# Patient Record
Sex: MALE | Race: WHITE | NOT HISPANIC OR LATINO | Employment: FULL TIME | URBAN - METROPOLITAN AREA
[De-identification: names, ages, dates, MRNs, and addresses within clinical notes are randomized per-mention and may not be internally consistent; named-entity substitution may affect disease eponyms.]

---

## 2019-10-10 ENCOUNTER — OFFICE VISIT - HEALTHEAST (OUTPATIENT)
Dept: FAMILY MEDICINE | Facility: CLINIC | Age: 39
End: 2019-10-10

## 2019-10-10 DIAGNOSIS — R03.0 ELEVATED BLOOD PRESSURE READING WITHOUT DIAGNOSIS OF HYPERTENSION: ICD-10-CM

## 2019-10-10 DIAGNOSIS — R05.9 COUGH: ICD-10-CM

## 2019-10-10 DIAGNOSIS — E66.01 MORBID OBESITY (H): ICD-10-CM

## 2019-10-10 DIAGNOSIS — J32.0 MAXILLARY SINUSITIS, UNSPECIFIED CHRONICITY: ICD-10-CM

## 2019-10-10 DIAGNOSIS — Z76.89 ENCOUNTER TO ESTABLISH CARE: ICD-10-CM

## 2019-10-10 RX ORDER — BENZONATATE 200 MG/1
200 CAPSULE ORAL 3 TIMES DAILY PRN
Qty: 30 CAPSULE | Refills: 0 | Status: SHIPPED | OUTPATIENT
Start: 2019-10-10 | End: 2022-04-15

## 2019-10-10 ASSESSMENT — MIFFLIN-ST. JEOR: SCORE: 2220.81

## 2021-06-02 ENCOUNTER — RECORDS - HEALTHEAST (OUTPATIENT)
Dept: ADMINISTRATIVE | Facility: CLINIC | Age: 41
End: 2021-06-02

## 2021-06-02 NOTE — PROGRESS NOTES
Assessment/Plan:     1. Encounter to establish care    2. Maxillary sinusitis, unspecified chronicity  Prescribed Augmentin twice daily x10 days.  This should cover for a pneumonia or sinus infection.  Tessalon 3 times daily as needed for cough.  Recommend following up with any worsening symptoms including fever, or shortness of breath.  - amoxicillin-clavulanate (AUGMENTIN) 875-125 mg per tablet; Take 1 tablet by mouth 2 (two) times a day for 10 days.  Dispense: 20 tablet; Refill: 0    3. Cough  - benzonatate (TESSALON) 200 MG capsule; Take 1 capsule (200 mg total) by mouth 3 (three) times a day as needed for cough.  Dispense: 30 capsule; Refill: 0    4. Elevated blood pressure reading without diagnosis of hypertension  Borderline blood pressure today in clinic.  No history of hypertension.  Patient is significantly lacking in sleep and has used a fair amount of caffeine today.  We will recheck at his next visit.    5. Morbid obesity (H)  Encouraged weight loss.        Subjective:     Ramiro Kessler is a 38 y.o. male who presents to establish care and with complaints of a cough.  Patient is  with 3 children.  He works as a .  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  Patient denies any chronic medical problems.  He is not on any regular medications.    Patient complains of a cough x4 weeks.  Symptoms started with sinus congestion.  The congestion has gotten better, but he has a lot of left facial pressure and postnasal drip.  Cough is mostly nonproductive.  He gets in to several coughing fits throughout the day, and is unable to stop.  Sleep has been disrupted.  Endorses left ear pressure.  No sore throat or fevers.  The patient thinks he is wheezing when he lays flat.  Does have shortness of breath, but only with coughing fits.  History of seasonal allergies.  Over-the-counter treatments include cough drops and cough  "suppressants.      The following portions of the patient's history were reviewed and updated as appropriate: allergies, current medications, past family history, past medical history, past social history, past surgical history and problem list.    Review of Systems  A comprehensive review of systems was performed and was otherwise negative    Objective:     /90   Pulse 93   Temp 98.7  F (37.1  C)   Ht 5' 10\" (1.778 m)   Wt (!) 286 lb 8 oz (130 kg)   SpO2 97%   BMI 41.11 kg/m      General Appearance: Alert, cooperative, no distress, appears stated age  Ears: Normal TM's and external ear canals, both ears  Nose: Nares normal, septum midline, mucosa edematous  Throat: Lips, mucosa, and tongue normal; teeth and gums normal  Neck: Supple, symmetrical, trachea midline, no adenopathy  Lungs: Lung sounds diminished on the left, respirations unlabored. No rhonchi or wheezing.  Heart: Regular rate and rhythm, S1 and S2 normal, no murmur, rub, or gallop    Ping Zuniga, NP    "

## 2021-06-03 VITALS
HEIGHT: 70 IN | OXYGEN SATURATION: 97 % | SYSTOLIC BLOOD PRESSURE: 146 MMHG | TEMPERATURE: 98.7 F | HEART RATE: 93 BPM | DIASTOLIC BLOOD PRESSURE: 90 MMHG | WEIGHT: 286.5 LBS | BODY MASS INDEX: 41.01 KG/M2

## 2022-02-17 ENCOUNTER — APPOINTMENT (OUTPATIENT)
Dept: CT IMAGING | Facility: CLINIC | Age: 42
End: 2022-02-17
Attending: EMERGENCY MEDICINE
Payer: COMMERCIAL

## 2022-02-17 ENCOUNTER — HOSPITAL ENCOUNTER (EMERGENCY)
Facility: CLINIC | Age: 42
Discharge: HOME | End: 2022-02-17
Attending: EMERGENCY MEDICINE | Admitting: EMERGENCY MEDICINE
Payer: COMMERCIAL

## 2022-02-17 VITALS
BODY MASS INDEX: 38.02 KG/M2 | HEART RATE: 87 BPM | RESPIRATION RATE: 26 BRPM | OXYGEN SATURATION: 95 % | SYSTOLIC BLOOD PRESSURE: 112 MMHG | TEMPERATURE: 97.6 F | WEIGHT: 265 LBS | DIASTOLIC BLOOD PRESSURE: 67 MMHG

## 2022-02-17 DIAGNOSIS — S29.011A INTERCOSTAL MUSCLE STRAIN, INITIAL ENCOUNTER: ICD-10-CM

## 2022-02-17 DIAGNOSIS — R09.1 PLEURISY: ICD-10-CM

## 2022-02-17 PROBLEM — U07.1 PNEUMONIA DUE TO COVID-19 VIRUS: Status: ACTIVE | Noted: 2021-12-25

## 2022-02-17 PROBLEM — J12.82 PNEUMONIA DUE TO COVID-19 VIRUS: Status: ACTIVE | Noted: 2021-12-25

## 2022-02-17 PROBLEM — E66.01 MORBID OBESITY (H): Status: ACTIVE | Noted: 2019-10-10

## 2022-02-17 PROBLEM — J96.01 ACUTE RESPIRATORY FAILURE WITH HYPOXIA (H): Status: ACTIVE | Noted: 2022-01-04

## 2022-02-17 LAB
ALBUMIN SERPL-MCNC: 4 G/DL (ref 3.5–5)
ALBUMIN UR-MCNC: 20 MG/DL
ALP SERPL-CCNC: 59 U/L (ref 45–120)
ALT SERPL W P-5'-P-CCNC: 29 U/L (ref 0–45)
AMORPH CRY #/AREA URNS HPF: ABNORMAL /HPF
ANION GAP SERPL CALCULATED.3IONS-SCNC: 8 MMOL/L (ref 5–18)
APPEARANCE UR: ABNORMAL
AST SERPL W P-5'-P-CCNC: 19 U/L (ref 0–40)
ATRIAL RATE - MUSE: 97 BPM
BASOPHILS # BLD AUTO: 0.1 10E3/UL (ref 0–0.2)
BASOPHILS NFR BLD AUTO: 1 %
BILIRUB SERPL-MCNC: 0.3 MG/DL (ref 0–1)
BILIRUB UR QL STRIP: NEGATIVE
BUN SERPL-MCNC: 7 MG/DL (ref 8–22)
CALCIUM SERPL-MCNC: 9.6 MG/DL (ref 8.5–10.5)
CAOX CRY #/AREA URNS HPF: ABNORMAL /HPF
CHLORIDE BLD-SCNC: 107 MMOL/L (ref 98–107)
CO2 SERPL-SCNC: 26 MMOL/L (ref 22–31)
COLOR UR AUTO: YELLOW
CREAT SERPL-MCNC: 0.78 MG/DL (ref 0.7–1.3)
DIASTOLIC BLOOD PRESSURE - MUSE: 93 MMHG
EOSINOPHIL # BLD AUTO: 0.5 10E3/UL (ref 0–0.7)
EOSINOPHIL NFR BLD AUTO: 4 %
ERYTHROCYTE [DISTWIDTH] IN BLOOD BY AUTOMATED COUNT: 15.3 % (ref 10–15)
GFR SERPL CREATININE-BSD FRML MDRD: >90 ML/MIN/1.73M2
GLUCOSE BLD-MCNC: 102 MG/DL (ref 70–125)
GLUCOSE UR STRIP-MCNC: NEGATIVE MG/DL
HCT VFR BLD AUTO: 47.5 % (ref 40–53)
HGB BLD-MCNC: 15.4 G/DL (ref 13.3–17.7)
HGB UR QL STRIP: NEGATIVE
HOLD SPECIMEN: NORMAL
HYALINE CASTS: 1 /LPF
IMM GRANULOCYTES # BLD: 0 10E3/UL
IMM GRANULOCYTES NFR BLD: 0 %
INTERPRETATION ECG - MUSE: NORMAL
KETONES UR STRIP-MCNC: NEGATIVE MG/DL
LEUKOCYTE ESTERASE UR QL STRIP: NEGATIVE
LIPASE SERPL-CCNC: 15 U/L (ref 0–52)
LYMPHOCYTES # BLD AUTO: 3.8 10E3/UL (ref 0.8–5.3)
LYMPHOCYTES NFR BLD AUTO: 29 %
MCH RBC QN AUTO: 26.8 PG (ref 26.5–33)
MCHC RBC AUTO-ENTMCNC: 32.4 G/DL (ref 31.5–36.5)
MCV RBC AUTO: 83 FL (ref 78–100)
MONOCYTES # BLD AUTO: 0.8 10E3/UL (ref 0–1.3)
MONOCYTES NFR BLD AUTO: 6 %
MUCOUS THREADS #/AREA URNS LPF: PRESENT /LPF
NEUTROPHILS # BLD AUTO: 8 10E3/UL (ref 1.6–8.3)
NEUTROPHILS NFR BLD AUTO: 60 %
NITRATE UR QL: NEGATIVE
NRBC # BLD AUTO: 0 10E3/UL
NRBC BLD AUTO-RTO: 0 /100
P AXIS - MUSE: 31 DEGREES
PH UR STRIP: 7 [PH] (ref 5–7)
PLATELET # BLD AUTO: 648 10E3/UL (ref 150–450)
POTASSIUM BLD-SCNC: 4.2 MMOL/L (ref 3.5–5)
PR INTERVAL - MUSE: 176 MS
PROT SERPL-MCNC: 7.3 G/DL (ref 6–8)
QRS DURATION - MUSE: 90 MS
QT - MUSE: 352 MS
QTC - MUSE: 447 MS
R AXIS - MUSE: 41 DEGREES
RBC # BLD AUTO: 5.74 10E6/UL (ref 4.4–5.9)
RBC URINE: 2 /HPF
SODIUM SERPL-SCNC: 141 MMOL/L (ref 136–145)
SP GR UR STRIP: 1.02 (ref 1–1.03)
SQUAMOUS EPITHELIAL: <1 /HPF
SYSTOLIC BLOOD PRESSURE - MUSE: 154 MMHG
T AXIS - MUSE: -7 DEGREES
TROPONIN I SERPL-MCNC: <0.01 NG/ML (ref 0–0.29)
UROBILINOGEN UR STRIP-MCNC: <2 MG/DL
VENTRICULAR RATE- MUSE: 97 BPM
WBC # BLD AUTO: 13.2 10E3/UL (ref 4–11)
WBC URINE: 5 /HPF

## 2022-02-17 PROCEDURE — 82040 ASSAY OF SERUM ALBUMIN: CPT | Performed by: EMERGENCY MEDICINE

## 2022-02-17 PROCEDURE — 250N000011 HC RX IP 250 OP 636: Performed by: EMERGENCY MEDICINE

## 2022-02-17 PROCEDURE — 99285 EMERGENCY DEPT VISIT HI MDM: CPT | Mod: 25

## 2022-02-17 PROCEDURE — 36415 COLL VENOUS BLD VENIPUNCTURE: CPT | Performed by: EMERGENCY MEDICINE

## 2022-02-17 PROCEDURE — 85025 COMPLETE CBC W/AUTO DIFF WBC: CPT | Performed by: EMERGENCY MEDICINE

## 2022-02-17 PROCEDURE — 71275 CT ANGIOGRAPHY CHEST: CPT

## 2022-02-17 PROCEDURE — 80053 COMPREHEN METABOLIC PANEL: CPT | Performed by: EMERGENCY MEDICINE

## 2022-02-17 PROCEDURE — 93005 ELECTROCARDIOGRAM TRACING: CPT | Performed by: EMERGENCY MEDICINE

## 2022-02-17 PROCEDURE — 81003 URINALYSIS AUTO W/O SCOPE: CPT | Performed by: EMERGENCY MEDICINE

## 2022-02-17 PROCEDURE — 83690 ASSAY OF LIPASE: CPT | Performed by: EMERGENCY MEDICINE

## 2022-02-17 PROCEDURE — 84484 ASSAY OF TROPONIN QUANT: CPT | Performed by: EMERGENCY MEDICINE

## 2022-02-17 PROCEDURE — 74176 CT ABD & PELVIS W/O CONTRAST: CPT

## 2022-02-17 RX ORDER — IOPAMIDOL 755 MG/ML
100 INJECTION, SOLUTION INTRAVASCULAR ONCE
Status: COMPLETED | OUTPATIENT
Start: 2022-02-17 | End: 2022-02-17

## 2022-02-17 RX ORDER — OXYCODONE AND ACETAMINOPHEN 5; 325 MG/1; MG/1
1 TABLET ORAL EVERY 6 HOURS PRN
Qty: 12 TABLET | Refills: 0 | Status: SHIPPED | OUTPATIENT
Start: 2022-02-17 | End: 2022-02-20

## 2022-02-17 RX ORDER — FAMOTIDINE 40 MG/1
40 TABLET, FILM COATED ORAL DAILY
Qty: 14 TABLET | Refills: 0 | Status: SHIPPED | OUTPATIENT
Start: 2022-02-17 | End: 2022-04-15

## 2022-02-17 RX ADMIN — IOPAMIDOL 75 ML: 755 INJECTION, SOLUTION INTRAVENOUS at 15:06

## 2022-02-17 ASSESSMENT — ENCOUNTER SYMPTOMS: COUGH: 1

## 2022-02-17 NOTE — ED TRIAGE NOTES
Patient is here with pleuric pain left side. He had an x ray last Friday with blood work and had continued covid pneumonia. Dx with covid on Dec 25. He was in ICU for 21 days and in the hospital for one month. He does have home o2 when sleeping he is on 2L NC and when walking on 4L. He is also on xarelto and cefdinir and just finished a z ga. o2 sats is 94% while walking on 4L.

## 2022-02-17 NOTE — ED PROVIDER NOTES
EMERGENCY DEPARTMENT ENCOUNTER      NAME: Ramiro Kessler  AGE: 41 year old male  YOB: 1980  MRN: [id number]  EVALUATION DATE & TIME: 2/17/2022 12:37 PM    PCP: Ping Zuniga    ED PROVIDER: Jeffrey Griggs MD    Chief Complaint   Patient presents with     Pleurisy     FINAL IMPRESSION:  1. Pleurisy    2. Intercostal muscle strain, initial encounter      ED COURSE & MEDICAL DECISION MAKING:    Pertinent Labs & Imaging studies reviewed. (See chart for details)  41 year old male presents to the Emergency Department for evaluation of pleuritic left-sided and left anterior chest pain.  History of severe Covid pneumonia requiring prolonged hospitalization discharged on chronic oxygen due to persistent hypoxia.  Slowly has been improving at home.  Persistent cough.  Had onset of left-sided chest pain with cough that was manageable at home had an outpatient x-ray that was negative for any unexpected findings had escalation to his symptoms today and presented to emergency department describes a pleuritic chest pain.  I felt based on his history and clinical examination this was most likely persistent pain from an intercostal strain due to repetitive coughing.  Rib fracture also a consideration.  Seems less likely to be a pulmonary emboli based on patient's chronic anticoagulation post hospitalization.  Ultimately due to the significance of patient's pain we proceeded with a work-up including CTA imaging of the chest and abdomen and pelvis given a history of kidney stones and there was some mild flank discomfort as a component to this.  His work-up overall demonstrating his COVID findings on CT scan imaging rest of the work-up was reassuring.  I think the patient is okay for discharge home with medication management of his symptoms I am going to add on also an antacid therapy as there could be a component of gastritis to this as well there was some mild left upper quadrant pain to palpation.  Ultimately  patient appearing safe for discharge with plan to follow-up on outpatient basis.  Patient comfortable this plan of care.          1:04 PM I met with the patient to gather history and to perform my initial exam. I discussed the plan for care while in the Emergency Department. PPE (gloves, glasses, surgical mask) was worn during patient encounters.   3:42 PM I re-evaluated the patient.     At the conclusion of the encounter I discussed the results of all of the tests and the disposition. The questions were answered. The patient or family acknowledged understanding and was agreeable with the care plan.     MEDICATIONS GIVEN IN THE EMERGENCY:  Medications   iopamidol (ISOVUE-370) solution 100 mL (75 mLs Intravenous Given 2/17/22 1506)       NEW PRESCRIPTIONS STARTED AT TODAY'S ER VISIT  New Prescriptions    FAMOTIDINE (PEPCID) 40 MG TABLET    Take 1 tablet (40 mg) by mouth daily    OXYCODONE-ACETAMINOPHEN (PERCOCET) 5-325 MG TABLET    Take 1 tablet by mouth every 6 hours as needed for pain          =================================================================    HPI    Patient information was obtained from: Patient    Use of : N/A       Ramiro Kessler is a 41 year old male with a pertinent history of obesity, COVID pneumonia, acute respiratory failure with hypoxia who presents to this ED by private vehicle for evaluation of pleuritic pain.    Per chart review, patient developed dry cough, shortness of breath, and diarrhea on 12/17 (~2 months ago) and tested positive for COVID-19 on 12/19 (~2 months ago). He had been seen in urgency room and started on Doxycycline for bacterial pneumonia on 12/22. His stats were 91% at the time. Patient was admitted on 12/25 (~2 months ago) when his stats dropped into the low 80s and CTA showed COVID pneumonia. He completed a 10 day course of Decadron. He received Tocilizumab as he was outside the window for remdesivir. Patient's D-dimer continued to increase during  "initial stay and patient started on Lovenox on 1/1/2022. They discharged the patient on 1/21/22 (~1 month ago) on 10L of oxygen for activity and 7L for rest. They planned to provide patient with at home PT/OT/SN. Discharged home in stable condition.     Patient reports he was diagnosed with COVID-19 on 12/25/2021 (~2 months ago) and spent 21 days in the ICU with COVID pneumonia. He never required intubation, but patient did go home on oxygen. Patient currently uses 4L during activity and 2L during the night. He continues to experience cough. About 2 weeks ago after coughing he developed right pleuritic pain that he states was a 3/10 in severity. Patient states pain is resolved with Tylenol and Ibuprofen. However, on 2/11 (~6 days ago) the pain returned at 5/10 in severity. Since he has been able to manage it with Ibuprofen, but today while sitting forward and coughing patient felt a \"popping sensation\" and since has been experiencing severe pain. Pain is at a 10/10 in severity. The pain is positional, so when he is coughing or sitting forward it exacerbates, but when lying down it resolves. Patient denies additional medical concerns or complaints at this time.       REVIEW OF SYSTEMS   Review of Systems   Respiratory: Positive for cough.         Endorses right sided pleuritic pain.   All other systems reviewed and are negative.      PAST MEDICAL HISTORY:  History reviewed. No pertinent past medical history.    PAST SURGICAL HISTORY:  Past Surgical History:   Procedure Laterality Date     NO PAST SURGERIES             CURRENT MEDICATIONS:    famotidine (PEPCID) 40 MG tablet  oxyCODONE-acetaminophen (PERCOCET) 5-325 MG tablet  benzonatate (TESSALON) 200 MG capsule        ALLERGIES:  No Known Allergies    FAMILY HISTORY:  History reviewed. No pertinent family history.    SOCIAL HISTORY:   Social History     Socioeconomic History     Marital status:      Spouse name: Not on file     Number of children: Not on " file     Years of education: Not on file     Highest education level: Not on file   Occupational History     Not on file   Tobacco Use     Smoking status: Never Smoker     Smokeless tobacco: Never Used   Substance and Sexual Activity     Alcohol use: Yes     Drug use: Not on file     Sexual activity: Not on file   Other Topics Concern     Not on file   Social History Narrative     Not on file     Social Determinants of Health     Financial Resource Strain: Not on file   Food Insecurity: Not on file   Transportation Needs: Not on file   Physical Activity: Not on file   Stress: Not on file   Social Connections: Not on file   Intimate Partner Violence: Not on file   Housing Stability: Not on file       VITALS:  /67   Pulse 87   Temp 97.6  F (36.4  C)   Resp 26   Wt 120.2 kg (265 lb)   SpO2 95%   BMI 38.02 kg/m      PHYSICAL EXAM    PHYSICAL EXAM    Constitutional: Well developed, Well nourished, NAD  HENT: Normocephalic, Atraumatic, Bilateral external ears normal, Oropharynx normal, mucous membranes moist, Nose normal. Neck-  Normal range of motion, No tenderness, Supple, No stridor.   Eyes: PERRL, EOMI, Conjunctiva normal, No discharge.   Respiratory: Normal breath sounds, No respiratory distress, No wheezing, Speaks full sentences easily. No cough.   Cardiovascular: tachycardia   GI:  Soft, No tenderness, No masses, No flank tenderness. No rebound or guarding.  : No cva tenderness    Musculoskeletal: 2+ DP pulses. No edema. No cyanosis. Good range of motion in all major joints. No tenderness to palpation. No tenderness of the CTLS spine.   Integument: Warm, Dry, No erythema, No rash. No petechiae.   Neurologic: Alert & oriented x 3, Normal motor function, Normal sensory function, No focal deficits noted.   Psychiatric: Affect normal, Judgment normal, Mood normal. Cooperative.        LAB:  All pertinent labs reviewed and interpreted.  Results for orders placed or performed during the hospital encounter  of 02/17/22   CT Chest Pulmonary Embolism w Contrast    Impression    IMPRESSION:  1.  Negative for pulmonary emboli and negative for dissections.    2.  Moderate diffuse interstitial infiltrates in both lungs. Appearance most suggestive of subacute to chronic inflammatory changes from previous COVID pneumonia.    3.  Would be difficult to definitively exclude an acute superimposed pneumonia but in general this appears more chronic to subacute.   CT Abdomen Pelvis w/o Contrast    Impression    IMPRESSION:   1.  Nothing to explain abdominal pain.    2.  Multiple Gallstones.    3.  A couple tiny nonobstructing intrarenal stones left kidney.     Extra Blue Top Tube   Result Value Ref Range    Hold Specimen JIC    Extra Green Top (Lithium Heparin) Tube   Result Value Ref Range    Hold Specimen     Extra Purple Top Tube   Result Value Ref Range    Hold Specimen     Comprehensive metabolic panel   Result Value Ref Range    Sodium 141 136 - 145 mmol/L    Potassium 4.2 3.5 - 5.0 mmol/L    Chloride 107 98 - 107 mmol/L    Carbon Dioxide (CO2) 26 22 - 31 mmol/L    Anion Gap 8 5 - 18 mmol/L    Urea Nitrogen 7 (L) 8 - 22 mg/dL    Creatinine 0.78 0.70 - 1.30 mg/dL    Calcium 9.6 8.5 - 10.5 mg/dL    Glucose 102 70 - 125 mg/dL    Alkaline Phosphatase 59 45 - 120 U/L    AST 19 0 - 40 U/L    ALT 29 0 - 45 U/L    Protein Total 7.3 6.0 - 8.0 g/dL    Albumin 4.0 3.5 - 5.0 g/dL    Bilirubin Total 0.3 0.0 - 1.0 mg/dL    GFR Estimate >90 >60 mL/min/1.73m2   Result Value Ref Range    Lipase 15 0 - 52 U/L   Result Value Ref Range    Troponin I <0.01 0.00 - 0.29 ng/mL   UA with Microscopic reflex to Culture    Specimen: Urine, Midstream   Result Value Ref Range    Color Urine Yellow Colorless, Straw, Light Yellow, Yellow    Appearance Urine Turbid (A) Clear    Glucose Urine Negative Negative mg/dL    Bilirubin Urine Negative Negative    Ketones Urine Negative Negative mg/dL    Specific Gravity Urine 1.025 1.001 - 1.030    Blood Urine Negative  Negative    pH Urine 7.0 5.0 - 7.0    Protein Albumin Urine 20  (A) Negative mg/dL    Urobilinogen Urine <2.0 <2.0 mg/dL    Nitrite Urine Negative Negative    Leukocyte Esterase Urine Negative Negative    Mucus Urine Present (A) None Seen /LPF    Amorphous Crystals Urine Few (A) None Seen /HPF    Calcium Oxalate Crystals Urine Moderate (A) None Seen /HPF    RBC Urine 2 <=2 /HPF    WBC Urine 5 <=5 /HPF    Squamous Epithelials Urine <1 <=1 /HPF    Hyaline Casts Urine 1 <=2 /LPF   CBC with platelets and differential   Result Value Ref Range    WBC Count 13.2 (H) 4.0 - 11.0 10e3/uL    RBC Count 5.74 4.40 - 5.90 10e6/uL    Hemoglobin 15.4 13.3 - 17.7 g/dL    Hematocrit 47.5 40.0 - 53.0 %    MCV 83 78 - 100 fL    MCH 26.8 26.5 - 33.0 pg    MCHC 32.4 31.5 - 36.5 g/dL    RDW 15.3 (H) 10.0 - 15.0 %    Platelet Count 648 (H) 150 - 450 10e3/uL    % Neutrophils 60 %    % Lymphocytes 29 %    % Monocytes 6 %    % Eosinophils 4 %    % Basophils 1 %    % Immature Granulocytes 0 %    NRBCs per 100 WBC 0 <1 /100    Absolute Neutrophils 8.0 1.6 - 8.3 10e3/uL    Absolute Lymphocytes 3.8 0.8 - 5.3 10e3/uL    Absolute Monocytes 0.8 0.0 - 1.3 10e3/uL    Absolute Eosinophils 0.5 0.0 - 0.7 10e3/uL    Absolute Basophils 0.1 0.0 - 0.2 10e3/uL    Absolute Immature Granulocytes 0.0 <=0.4 10e3/uL    Absolute NRBCs 0.0 10e3/uL   ECG 12-LEAD WITH MUSE (LHE)   Result Value Ref Range    Systolic Blood Pressure 154 mmHg    Diastolic Blood Pressure 93 mmHg    Ventricular Rate 97 BPM    Atrial Rate 97 BPM    CT Interval 176 ms    QRS Duration 90 ms     ms    QTc 447 ms    P Axis 31 degrees    R AXIS 41 degrees    T Axis -7 degrees    Interpretation ECG       Sinus rhythm  Normal ECG  When compared with ECG of 11-AUG-2010 10:24,  No significant change was found  Confirmed by SEE ED PROVIDER NOTE FOR, ECG INTERPRETATION ([id number]),  JOHANNA DIAZ ([id number]) on 2/17/2022 3:51:35 PM         RADIOLOGY:  Reviewed all pertinent imaging. Please see  official radiology report.  CT Chest Pulmonary Embolism w Contrast   Final Result   IMPRESSION:   1.  Negative for pulmonary emboli and negative for dissections.      2.  Moderate diffuse interstitial infiltrates in both lungs. Appearance most suggestive of subacute to chronic inflammatory changes from previous COVID pneumonia.      3.  Would be difficult to definitively exclude an acute superimposed pneumonia but in general this appears more chronic to subacute.      CT Abdomen Pelvis w/o Contrast   Final Result   IMPRESSION:    1.  Nothing to explain abdominal pain.      2.  Multiple Gallstones.      3.  A couple tiny nonobstructing intrarenal stones left kidney.             EKG:    Performed at: 13:23    Impression: Sinus rhythm, normal ECG    Rate: 97  Rhythm: Sinus  Axis: 41  ID Interval: 176  QRS Interval: 90  QTc Interval: 447  ST Changes: None  Comparison: August 11, 2020    I have independently reviewed and interpreted the EKG(s) documented above.        I, Azizaisabel Feldman, am serving as a scribe to document services personally performed by Indu Murphy MD based on my observation and the provider's statements to me. I, Jeffrey Griggs MD, attest that Aziza Feldman is acting in a scribe capacity, has observed my performance of the services and has documented them in accordance with my direction.    Indu Murphy MD  Emergency Medicine  [facility] EMERGENCY ROOM  [address] [facility] [id number] [phone]       Jeffrey Griggs MD  02/17/22 [id number]

## 2022-02-17 NOTE — DISCHARGE INSTRUCTIONS
Recommend short course of pain management for your discomfort.  Most likely this is an intercostal muscle strain where the muscles between your ribs were injured due to your repetitive coughing.  Expect improvement to your symptoms over the next couple of days.  In addition I would recommend taking antacid therapy in case there is a component of gastritis.  Follow-up with your primary care doctor and return if symptoms escalate

## 2022-02-17 NOTE — ED NOTES
Patient discharged home with AVS. Will  meds from pharmacy and take as directed. Will follow up with PCP.

## 2022-04-15 ENCOUNTER — APPOINTMENT (OUTPATIENT)
Dept: ULTRASOUND IMAGING | Facility: CLINIC | Age: 42
End: 2022-04-15
Attending: EMERGENCY MEDICINE
Payer: COMMERCIAL

## 2022-04-15 ENCOUNTER — HOSPITAL ENCOUNTER (OUTPATIENT)
Facility: CLINIC | Age: 42
Discharge: HOME | End: 2022-04-15
Attending: EMERGENCY MEDICINE | Admitting: INTERNAL MEDICINE
Payer: COMMERCIAL

## 2022-04-15 ENCOUNTER — ANESTHESIA (OUTPATIENT)
Dept: SURGERY | Facility: CLINIC | Age: 42
End: 2022-04-15
Payer: COMMERCIAL

## 2022-04-15 ENCOUNTER — ANESTHESIA EVENT (OUTPATIENT)
Dept: SURGERY | Facility: CLINIC | Age: 42
End: 2022-04-15
Payer: COMMERCIAL

## 2022-04-15 VITALS
SYSTOLIC BLOOD PRESSURE: 164 MMHG | HEART RATE: 99 BPM | RESPIRATION RATE: 20 BRPM | HEIGHT: 70 IN | BODY MASS INDEX: 38.98 KG/M2 | OXYGEN SATURATION: 93 % | WEIGHT: 272.25 LBS | TEMPERATURE: 97.3 F | DIASTOLIC BLOOD PRESSURE: 89 MMHG

## 2022-04-15 DIAGNOSIS — K81.0 ACUTE CHOLECYSTITIS: ICD-10-CM

## 2022-04-15 DIAGNOSIS — K81.9 CHOLECYSTITIS: Primary | ICD-10-CM

## 2022-04-15 LAB
ALBUMIN SERPL-MCNC: 4.4 G/DL (ref 3.5–5)
ALP SERPL-CCNC: 71 U/L (ref 45–120)
ALT SERPL W P-5'-P-CCNC: 26 U/L (ref 0–45)
AMPHETAMINES UR QL SCN: ABNORMAL
ANION GAP SERPL CALCULATED.3IONS-SCNC: 15 MMOL/L (ref 5–18)
AST SERPL W P-5'-P-CCNC: 24 U/L (ref 0–40)
ATRIAL RATE - MUSE: 100 BPM
BARBITURATES UR QL: ABNORMAL
BASOPHILS # BLD AUTO: 0.1 10E3/UL (ref 0–0.2)
BASOPHILS NFR BLD AUTO: 0 %
BENZODIAZ UR QL: ABNORMAL
BILIRUB SERPL-MCNC: 0.4 MG/DL (ref 0–1)
BUN SERPL-MCNC: 11 MG/DL (ref 8–22)
CALCIUM SERPL-MCNC: 9.6 MG/DL (ref 8.5–10.5)
CANNABINOIDS UR QL SCN: ABNORMAL
CHLORIDE BLD-SCNC: 103 MMOL/L (ref 98–107)
CO2 SERPL-SCNC: 21 MMOL/L (ref 22–31)
COCAINE UR QL: ABNORMAL
CREAT SERPL-MCNC: 0.93 MG/DL (ref 0.7–1.3)
CREAT UR-MCNC: 220 MG/DL
DIASTOLIC BLOOD PRESSURE - MUSE: 83 MMHG
EOSINOPHIL # BLD AUTO: 0.1 10E3/UL (ref 0–0.7)
EOSINOPHIL NFR BLD AUTO: 1 %
ERYTHROCYTE [DISTWIDTH] IN BLOOD BY AUTOMATED COUNT: 14.6 % (ref 10–15)
GFR SERPL CREATININE-BSD FRML MDRD: >90 ML/MIN/1.73M2
GLUCOSE BLD-MCNC: 147 MG/DL (ref 70–125)
HCT VFR BLD AUTO: 49.4 % (ref 40–53)
HGB BLD-MCNC: 16.5 G/DL (ref 13.3–17.7)
HOLD SPECIMEN: NORMAL
IMM GRANULOCYTES # BLD: 0.1 10E3/UL
IMM GRANULOCYTES NFR BLD: 0 %
INTERPRETATION ECG - MUSE: NORMAL
LACTATE SERPL-SCNC: 1.4 MMOL/L (ref 0.7–2)
LIPASE SERPL-CCNC: 19 U/L (ref 0–52)
LYMPHOCYTES # BLD AUTO: 2.8 10E3/UL (ref 0.8–5.3)
LYMPHOCYTES NFR BLD AUTO: 21 %
MCH RBC QN AUTO: 27 PG (ref 26.5–33)
MCHC RBC AUTO-ENTMCNC: 33.4 G/DL (ref 31.5–36.5)
MCV RBC AUTO: 81 FL (ref 78–100)
METHADONE UR QL SCN: ABNORMAL
MONOCYTES # BLD AUTO: 0.7 10E3/UL (ref 0–1.3)
MONOCYTES NFR BLD AUTO: 5 %
NEUTROPHILS # BLD AUTO: 10 10E3/UL (ref 1.6–8.3)
NEUTROPHILS NFR BLD AUTO: 73 %
NRBC # BLD AUTO: 0 10E3/UL
NRBC BLD AUTO-RTO: 0 /100
OPIATES UR QL SCN: ABNORMAL
OXYCODONE UR QL: ABNORMAL
P AXIS - MUSE: 43 DEGREES
PCP UR QL SCN: ABNORMAL
PLATELET # BLD AUTO: 741 10E3/UL (ref 150–450)
POTASSIUM BLD-SCNC: 4.2 MMOL/L (ref 3.5–5)
PR INTERVAL - MUSE: 182 MS
PROT SERPL-MCNC: 7.5 G/DL (ref 6–8)
QRS DURATION - MUSE: 102 MS
QT - MUSE: 364 MS
QTC - MUSE: 469 MS
R AXIS - MUSE: 54 DEGREES
RBC # BLD AUTO: 6.11 10E6/UL (ref 4.4–5.9)
SARS-COV-2 RNA RESP QL NAA+PROBE: NEGATIVE
SODIUM SERPL-SCNC: 139 MMOL/L (ref 136–145)
SYSTOLIC BLOOD PRESSURE - MUSE: 136 MMHG
T AXIS - MUSE: 18 DEGREES
VENTRICULAR RATE- MUSE: 100 BPM
WBC # BLD AUTO: 13.7 10E3/UL (ref 4–11)

## 2022-04-15 PROCEDURE — 87635 SARS-COV-2 COVID-19 AMP PRB: CPT | Performed by: EMERGENCY MEDICINE

## 2022-04-15 PROCEDURE — 258N000003 HC RX IP 258 OP 636: Performed by: EMERGENCY MEDICINE

## 2022-04-15 PROCEDURE — 96360 HYDRATION IV INFUSION INIT: CPT

## 2022-04-15 PROCEDURE — 250N000011 HC RX IP 250 OP 636: Performed by: SPECIALIST

## 2022-04-15 PROCEDURE — 258N000003 HC RX IP 258 OP 636: Performed by: ANESTHESIOLOGY

## 2022-04-15 PROCEDURE — 99285 EMERGENCY DEPT VISIT HI MDM: CPT | Mod: 25

## 2022-04-15 PROCEDURE — 250N000009 HC RX 250: Performed by: ANESTHESIOLOGY

## 2022-04-15 PROCEDURE — 47562 LAPAROSCOPIC CHOLECYSTECTOMY: CPT | Performed by: SPECIALIST

## 2022-04-15 PROCEDURE — 85025 COMPLETE CBC W/AUTO DIFF WBC: CPT | Performed by: EMERGENCY MEDICINE

## 2022-04-15 PROCEDURE — 80053 COMPREHEN METABOLIC PANEL: CPT | Performed by: EMERGENCY MEDICINE

## 2022-04-15 PROCEDURE — 250N000011 HC RX IP 250 OP 636: Performed by: EMERGENCY MEDICINE

## 2022-04-15 PROCEDURE — 93005 ELECTROCARDIOGRAM TRACING: CPT | Mod: XU | Performed by: EMERGENCY MEDICINE

## 2022-04-15 PROCEDURE — 999N000141 HC STATISTIC PRE-PROCEDURE NURSING ASSESSMENT: Performed by: SPECIALIST

## 2022-04-15 PROCEDURE — 250N000025 HC SEVOFLURANE, PER MIN: Performed by: SPECIALIST

## 2022-04-15 PROCEDURE — 250N000011 HC RX IP 250 OP 636: Performed by: ANESTHESIOLOGY

## 2022-04-15 PROCEDURE — 96375 TX/PRO/DX INJ NEW DRUG ADDON: CPT

## 2022-04-15 PROCEDURE — 250N000013 HC RX MED GY IP 250 OP 250 PS 637: Performed by: ANESTHESIOLOGY

## 2022-04-15 PROCEDURE — 80307 DRUG TEST PRSMV CHEM ANLYZR: CPT | Performed by: EMERGENCY MEDICINE

## 2022-04-15 PROCEDURE — 76705 ECHO EXAM OF ABDOMEN: CPT

## 2022-04-15 PROCEDURE — 250N000011 HC RX IP 250 OP 636: Performed by: INTERNAL MEDICINE

## 2022-04-15 PROCEDURE — 96365 THER/PROPH/DIAG IV INF INIT: CPT

## 2022-04-15 PROCEDURE — 36415 COLL VENOUS BLD VENIPUNCTURE: CPT | Performed by: EMERGENCY MEDICINE

## 2022-04-15 PROCEDURE — 370N000017 HC ANESTHESIA TECHNICAL FEE, PER MIN: Performed by: SPECIALIST

## 2022-04-15 PROCEDURE — C9803 HOPD COVID-19 SPEC COLLECT: HCPCS

## 2022-04-15 PROCEDURE — 258N000001 HC RX 258: Performed by: SPECIALIST

## 2022-04-15 PROCEDURE — 99222 1ST HOSP IP/OBS MODERATE 55: CPT | Performed by: INTERNAL MEDICINE

## 2022-04-15 PROCEDURE — 96376 TX/PRO/DX INJ SAME DRUG ADON: CPT

## 2022-04-15 PROCEDURE — 250N000011 HC RX IP 250 OP 636: Performed by: NURSE ANESTHETIST, CERTIFIED REGISTERED

## 2022-04-15 PROCEDURE — 99222 1ST HOSP IP/OBS MODERATE 55: CPT | Mod: 57 | Performed by: SPECIALIST

## 2022-04-15 PROCEDURE — 360N000076 HC SURGERY LEVEL 3, PER MIN: Performed by: SPECIALIST

## 2022-04-15 PROCEDURE — 710N000010 HC RECOVERY PHASE 1, LEVEL 2, PER MIN: Performed by: SPECIALIST

## 2022-04-15 PROCEDURE — 96361 HYDRATE IV INFUSION ADD-ON: CPT

## 2022-04-15 PROCEDURE — 272N000001 HC OR GENERAL SUPPLY STERILE: Performed by: SPECIALIST

## 2022-04-15 PROCEDURE — 250N000009 HC RX 250: Performed by: NURSE ANESTHETIST, CERTIFIED REGISTERED

## 2022-04-15 PROCEDURE — 88304 TISSUE EXAM BY PATHOLOGIST: CPT | Mod: TC | Performed by: SPECIALIST

## 2022-04-15 PROCEDURE — 250N000013 HC RX MED GY IP 250 OP 250 PS 637: Performed by: SPECIALIST

## 2022-04-15 PROCEDURE — 83605 ASSAY OF LACTIC ACID: CPT | Performed by: EMERGENCY MEDICINE

## 2022-04-15 PROCEDURE — 83690 ASSAY OF LIPASE: CPT | Performed by: EMERGENCY MEDICINE

## 2022-04-15 RX ORDER — ONDANSETRON 4 MG/1
4 TABLET, ORALLY DISINTEGRATING ORAL EVERY 30 MIN PRN
Status: DISCONTINUED | OUTPATIENT
Start: 2022-04-15 | End: 2022-04-15 | Stop reason: HOSPADM

## 2022-04-15 RX ORDER — OXYCODONE HYDROCHLORIDE 5 MG/1
5 TABLET ORAL EVERY 4 HOURS PRN
Status: DISCONTINUED | OUTPATIENT
Start: 2022-04-15 | End: 2022-04-15 | Stop reason: HOSPADM

## 2022-04-15 RX ORDER — SODIUM CHLORIDE 9 MG/ML
INJECTION, SOLUTION INTRAVENOUS CONTINUOUS
Status: DISCONTINUED | OUTPATIENT
Start: 2022-04-15 | End: 2022-04-15 | Stop reason: HOSPADM

## 2022-04-15 RX ORDER — HYDROMORPHONE HCL IN WATER/PF 6 MG/30 ML
0.2 PATIENT CONTROLLED ANALGESIA SYRINGE INTRAVENOUS EVERY 5 MIN PRN
Status: DISCONTINUED | OUTPATIENT
Start: 2022-04-15 | End: 2022-04-15 | Stop reason: HOSPADM

## 2022-04-15 RX ORDER — SODIUM CHLORIDE, SODIUM LACTATE, POTASSIUM CHLORIDE, CALCIUM CHLORIDE 600; 310; 30; 20 MG/100ML; MG/100ML; MG/100ML; MG/100ML
INJECTION, SOLUTION INTRAVENOUS CONTINUOUS
Status: DISCONTINUED | OUTPATIENT
Start: 2022-04-15 | End: 2022-04-15 | Stop reason: HOSPADM

## 2022-04-15 RX ORDER — FENTANYL CITRATE 50 UG/ML
25 INJECTION, SOLUTION INTRAMUSCULAR; INTRAVENOUS EVERY 5 MIN PRN
Status: DISCONTINUED | OUTPATIENT
Start: 2022-04-15 | End: 2022-04-15 | Stop reason: HOSPADM

## 2022-04-15 RX ORDER — ONDANSETRON 2 MG/ML
4 INJECTION INTRAMUSCULAR; INTRAVENOUS EVERY 30 MIN PRN
Status: DISCONTINUED | OUTPATIENT
Start: 2022-04-15 | End: 2022-04-15 | Stop reason: HOSPADM

## 2022-04-15 RX ORDER — LIDOCAINE 40 MG/G
CREAM TOPICAL
Status: DISCONTINUED | OUTPATIENT
Start: 2022-04-15 | End: 2022-04-15 | Stop reason: HOSPADM

## 2022-04-15 RX ORDER — ONDANSETRON 4 MG/1
4 TABLET, ORALLY DISINTEGRATING ORAL EVERY 6 HOURS PRN
Status: DISCONTINUED | OUTPATIENT
Start: 2022-04-15 | End: 2022-04-15 | Stop reason: HOSPADM

## 2022-04-15 RX ORDER — ALBUTEROL SULFATE 90 UG/1
1-2 AEROSOL, METERED RESPIRATORY (INHALATION) EVERY 4 HOURS PRN
COMMUNITY

## 2022-04-15 RX ORDER — KETOROLAC TROMETHAMINE 30 MG/ML
INJECTION, SOLUTION INTRAMUSCULAR; INTRAVENOUS PRN
Status: DISCONTINUED | OUTPATIENT
Start: 2022-04-15 | End: 2022-04-15

## 2022-04-15 RX ORDER — FENTANYL CITRATE 50 UG/ML
25 INJECTION, SOLUTION INTRAMUSCULAR; INTRAVENOUS
Status: DISCONTINUED | OUTPATIENT
Start: 2022-04-15 | End: 2022-04-15 | Stop reason: HOSPADM

## 2022-04-15 RX ORDER — HYDROCODONE BITARTRATE AND ACETAMINOPHEN 5; 325 MG/1; MG/1
1 TABLET ORAL
Status: DISCONTINUED | OUTPATIENT
Start: 2022-04-15 | End: 2022-04-15 | Stop reason: HOSPADM

## 2022-04-15 RX ORDER — NALOXONE HYDROCHLORIDE 0.4 MG/ML
0.4 INJECTION, SOLUTION INTRAMUSCULAR; INTRAVENOUS; SUBCUTANEOUS
Status: DISCONTINUED | OUTPATIENT
Start: 2022-04-15 | End: 2022-04-15 | Stop reason: HOSPADM

## 2022-04-15 RX ORDER — ALBUTEROL SULFATE 90 UG/1
1-2 AEROSOL, METERED RESPIRATORY (INHALATION) EVERY 4 HOURS PRN
Status: DISCONTINUED | OUTPATIENT
Start: 2022-04-15 | End: 2022-04-15 | Stop reason: HOSPADM

## 2022-04-15 RX ORDER — NALOXONE HYDROCHLORIDE 0.4 MG/ML
0.2 INJECTION, SOLUTION INTRAMUSCULAR; INTRAVENOUS; SUBCUTANEOUS
Status: DISCONTINUED | OUTPATIENT
Start: 2022-04-15 | End: 2022-04-15 | Stop reason: HOSPADM

## 2022-04-15 RX ORDER — DEXAMETHASONE SODIUM PHOSPHATE 10 MG/ML
INJECTION, SOLUTION INTRAMUSCULAR; INTRAVENOUS PRN
Status: DISCONTINUED | OUTPATIENT
Start: 2022-04-15 | End: 2022-04-15

## 2022-04-15 RX ORDER — FENTANYL CITRATE 50 UG/ML
INJECTION, SOLUTION INTRAMUSCULAR; INTRAVENOUS PRN
Status: DISCONTINUED | OUTPATIENT
Start: 2022-04-15 | End: 2022-04-15

## 2022-04-15 RX ORDER — KETOROLAC TROMETHAMINE 15 MG/ML
15 INJECTION, SOLUTION INTRAMUSCULAR; INTRAVENOUS ONCE
Status: COMPLETED | OUTPATIENT
Start: 2022-04-15 | End: 2022-04-15

## 2022-04-15 RX ORDER — PROPOFOL 10 MG/ML
INJECTION, EMULSION INTRAVENOUS PRN
Status: DISCONTINUED | OUTPATIENT
Start: 2022-04-15 | End: 2022-04-15

## 2022-04-15 RX ORDER — MORPHINE SULFATE 4 MG/ML
4 INJECTION, SOLUTION INTRAMUSCULAR; INTRAVENOUS
Status: DISCONTINUED | OUTPATIENT
Start: 2022-04-15 | End: 2022-04-15

## 2022-04-15 RX ORDER — OXYCODONE AND ACETAMINOPHEN 5; 325 MG/1; MG/1
1 TABLET ORAL EVERY 6 HOURS PRN
Qty: 20 TABLET | Refills: 0 | Status: SHIPPED | OUTPATIENT
Start: 2022-04-15 | End: 2022-04-18

## 2022-04-15 RX ORDER — BUPIVACAINE HYDROCHLORIDE 2.5 MG/ML
INJECTION, SOLUTION INFILTRATION; PERINEURAL PRN
Status: DISCONTINUED | OUTPATIENT
Start: 2022-04-15 | End: 2022-04-15 | Stop reason: HOSPADM

## 2022-04-15 RX ORDER — LORAZEPAM 0.5 MG/1
0.5 TABLET ORAL
COMMUNITY

## 2022-04-15 RX ORDER — ACETAMINOPHEN 325 MG/1
975 TABLET ORAL ONCE
Status: COMPLETED | OUTPATIENT
Start: 2022-04-15 | End: 2022-04-15

## 2022-04-15 RX ORDER — ONDANSETRON 2 MG/ML
4 INJECTION INTRAMUSCULAR; INTRAVENOUS EVERY 6 HOURS PRN
Status: DISCONTINUED | OUTPATIENT
Start: 2022-04-15 | End: 2022-04-15 | Stop reason: HOSPADM

## 2022-04-15 RX ORDER — PIPERACILLIN SODIUM, TAZOBACTAM SODIUM 3; .375 G/15ML; G/15ML
3.38 INJECTION, POWDER, LYOPHILIZED, FOR SOLUTION INTRAVENOUS ONCE
Status: COMPLETED | OUTPATIENT
Start: 2022-04-15 | End: 2022-04-15

## 2022-04-15 RX ORDER — MORPHINE SULFATE 2 MG/ML
2 INJECTION, SOLUTION INTRAMUSCULAR; INTRAVENOUS
Status: DISCONTINUED | OUTPATIENT
Start: 2022-04-15 | End: 2022-04-15 | Stop reason: HOSPADM

## 2022-04-15 RX ORDER — MEPERIDINE HYDROCHLORIDE 25 MG/ML
12.5 INJECTION INTRAMUSCULAR; INTRAVENOUS; SUBCUTANEOUS
Status: DISCONTINUED | OUTPATIENT
Start: 2022-04-15 | End: 2022-04-15 | Stop reason: HOSPADM

## 2022-04-15 RX ORDER — PIPERACILLIN SODIUM, TAZOBACTAM SODIUM 3; .375 G/15ML; G/15ML
3.38 INJECTION, POWDER, LYOPHILIZED, FOR SOLUTION INTRAVENOUS EVERY 8 HOURS
Status: DISCONTINUED | OUTPATIENT
Start: 2022-04-15 | End: 2022-04-15 | Stop reason: HOSPADM

## 2022-04-15 RX ADMIN — MORPHINE SULFATE 4 MG: 4 INJECTION, SOLUTION INTRAMUSCULAR; INTRAVENOUS at 07:40

## 2022-04-15 RX ADMIN — FENTANYL CITRATE 100 MCG: 50 INJECTION, SOLUTION INTRAMUSCULAR; INTRAVENOUS at 14:07

## 2022-04-15 RX ADMIN — KETOROLAC TROMETHAMINE 15 MG: 15 INJECTION, SOLUTION INTRAMUSCULAR; INTRAVENOUS at 07:33

## 2022-04-15 RX ADMIN — FENTANYL CITRATE 25 MCG: 50 INJECTION, SOLUTION INTRAMUSCULAR; INTRAVENOUS at 15:52

## 2022-04-15 RX ADMIN — SODIUM CHLORIDE 1000 ML: 9 INJECTION, SOLUTION INTRAVENOUS at 07:45

## 2022-04-15 RX ADMIN — SODIUM CHLORIDE, POTASSIUM CHLORIDE, SODIUM LACTATE AND CALCIUM CHLORIDE: 600; 310; 30; 20 INJECTION, SOLUTION INTRAVENOUS at 13:27

## 2022-04-15 RX ADMIN — SUGAMMADEX 200 MG: 100 INJECTION, SOLUTION INTRAVENOUS at 15:01

## 2022-04-15 RX ADMIN — ONDANSETRON 4 MG: 2 INJECTION INTRAMUSCULAR; INTRAVENOUS at 14:40

## 2022-04-15 RX ADMIN — FENTANYL CITRATE 50 MCG: 50 INJECTION, SOLUTION INTRAMUSCULAR; INTRAVENOUS at 14:34

## 2022-04-15 RX ADMIN — ROCURONIUM BROMIDE 50 MG: 10 INJECTION, SOLUTION INTRAVENOUS at 14:07

## 2022-04-15 RX ADMIN — DEXAMETHASONE SODIUM PHOSPHATE 10 MG: 10 INJECTION, SOLUTION INTRAMUSCULAR; INTRAVENOUS at 14:07

## 2022-04-15 RX ADMIN — PROPOFOL 50 MG: 10 INJECTION, EMULSION INTRAVENOUS at 14:58

## 2022-04-15 RX ADMIN — KETOROLAC TROMETHAMINE 30 MG: 30 INJECTION, SOLUTION INTRAMUSCULAR at 15:15

## 2022-04-15 RX ADMIN — ONDANSETRON 4 MG: 2 INJECTION INTRAMUSCULAR; INTRAVENOUS at 09:45

## 2022-04-15 RX ADMIN — PROPOFOL 200 MG: 10 INJECTION, EMULSION INTRAVENOUS at 14:07

## 2022-04-15 RX ADMIN — OXYCODONE HYDROCHLORIDE 5 MG: 5 TABLET ORAL at 16:54

## 2022-04-15 RX ADMIN — PROPOFOL 100 MG: 10 INJECTION, EMULSION INTRAVENOUS at 14:30

## 2022-04-15 RX ADMIN — HYDROMORPHONE HYDROCHLORIDE 0.2 MG: 0.2 INJECTION, SOLUTION INTRAMUSCULAR; INTRAVENOUS; SUBCUTANEOUS at 16:14

## 2022-04-15 RX ADMIN — LIDOCAINE HYDROCHLORIDE 20 ML: 10 INJECTION, SOLUTION INFILTRATION; PERINEURAL at 14:07

## 2022-04-15 RX ADMIN — HYDROMORPHONE HYDROCHLORIDE 0.2 MG: 0.2 INJECTION, SOLUTION INTRAMUSCULAR; INTRAVENOUS; SUBCUTANEOUS at 16:07

## 2022-04-15 RX ADMIN — FENTANYL CITRATE 25 MCG: 50 INJECTION, SOLUTION INTRAMUSCULAR; INTRAVENOUS at 15:57

## 2022-04-15 RX ADMIN — PIPERACILLIN AND TAZOBACTAM 3.38 G: 3; .375 INJECTION, POWDER, LYOPHILIZED, FOR SOLUTION INTRAVENOUS at 09:00

## 2022-04-15 RX ADMIN — FENTANYL CITRATE 25 MCG: 50 INJECTION, SOLUTION INTRAMUSCULAR; INTRAVENOUS at 15:47

## 2022-04-15 RX ADMIN — MORPHINE SULFATE 4 MG: 4 INJECTION, SOLUTION INTRAMUSCULAR; INTRAVENOUS at 10:36

## 2022-04-15 RX ADMIN — ACETAMINOPHEN 975 MG: 325 TABLET ORAL at 13:30

## 2022-04-15 RX ADMIN — SODIUM CHLORIDE: 9 INJECTION, SOLUTION INTRAVENOUS at 09:10

## 2022-04-15 RX ADMIN — FENTANYL CITRATE 25 MCG: 50 INJECTION, SOLUTION INTRAMUSCULAR; INTRAVENOUS at 15:42

## 2022-04-15 RX ADMIN — MORPHINE SULFATE 2 MG: 2 INJECTION, SOLUTION INTRAMUSCULAR; INTRAVENOUS at 12:23

## 2022-04-15 RX ADMIN — MIDAZOLAM 2 MG: 1 INJECTION INTRAMUSCULAR; INTRAVENOUS at 14:05

## 2022-04-15 ASSESSMENT — ACTIVITIES OF DAILY LIVING (ADL)
ADLS_ACUITY_SCORE: 7

## 2022-04-15 NOTE — CONSULTS
Consultation - Surgery  Ramiro Kessler,  1980, MRN [id number]    Admitting Dx: Acute cholecystitis [K81.0]    PCP: Nicholas Gallegos, None   Code status:  No Order       Extended Emergency Contact Information  Primary Emergency Contact: Yumiko Kessler  Address: [address] [place] [address] [facility]  Mobile Phone: [phone]  Relation: Spouse       Assessment and Plan   Impression:  Acute cholecystitis in a gentleman who is just recovering from a Covid infection.  He is fairly recently weaned off of his oxygen.  However in CT scan he clearly has cholecystitis and I think needs his gallbladder out.  If he was still very ill then we could entertain just doing a percutaneous cholecystostomy tube but I think at worst he may end up back on his oxygen for short time.  There is always some risk but I think a short operation with the gallbladder is in his best interest.      Plan:  Laparoscopic cholecystectomy.  Because of his just recently coming off oxygen at home from Covid, will probably want to watch him tonight in the hospital.  If he does exceedingly well in the recovery room then potentially could still just be discharged home later today.           Chief Complaint <principal problem not specified>       HPI    We have been requested by Dr. Nova to evaluate Ramiro Kessler for acute cholecystitis.  This is a 41 year old year old male with a rather abrupt onset of abdominal pain in the right upper quadrant at 3:00 this morning.  He has never had pain like this before although he does state it somewhat similar to when he had kidney stones.  He has had no previous abdominal surgery.  Pain is persistent and not any better after starting.  Somewhat hurts when he takes a big breath and moves.  CT scan showed multiple stones in his gallbladder.  On ultrasound he will once again confirmed gallstones with gallbladder wall thickening and a positive Varner sign.       Medical  History  Patient Active Problem List   Diagnosis     Morbid obesity (H)     Pneumonia due to COVID-19 virus     Closed dislocation of other part of foot     Acute respiratory failure with hypoxia (H)     Acute cholecystitis     Cholecystitis       [unfilled] Surgical History  Past Surgical History:   Procedure Laterality Date     NO PAST SURGERIES          Social History  Social History     Tobacco Use     Smoking status: Never Smoker     Smokeless tobacco: Never Used   Substance Use Topics     Alcohol use: Yes       Allergies  No Known Allergies Family History  Reviewed, and family history is not on file.  The Family history is not pertinent to the patients chief complaint. Psychosocial Needs  Social History     Social History Narrative     Not on file     Additional psychosocial needs reviewed per nursing assessment.     Prior to Admission Medications   Current Outpatient Medications   Medication Instructions     albuterol (PROAIR HFA/PROVENTIL HFA/VENTOLIN HFA) 108 (90 Base) MCG/ACT inhaler 1-2 puffs, Inhalation, EVERY 4 HOURS PRN     LORazepam (ATIVAN) 0.5 mg, Oral, AT BEDTIME PRN           Review of Systems:  Pertinent items are noted in HPI. Physical Exam:  Temp:  [97.7  F (36.5  C)] 97.7  F (36.5  C)  Pulse:  [] 109  Resp:  [20] 20  BP: (136-150)/(80-94) 140/80  SpO2:  [95 %-98 %] 97 %    General appearance: alert, appears stated age, cooperative and morbidly obese  Eyes: No scleral icterus  Lungs: clear to auscultation bilaterally  Heart:   Abdomen: Obese.  Quite tender in the right upper quadrant with some mild guarding but no rebound.  Extremities: extremities, peripheral pulses and reflexes normal  Pulses: 2+ and symmetric  Skin: Skin color, texture, turgor normal. No rashes or lesions  Neurologic: Grossly normal       Pertinent Labs  Lab Results: personally reviewed.   Lab Results   Component Value Date     04/15/2022    CO2 21 04/15/2022    BUN 11 04/15/2022     Lab Results   Component Value  Date    WBC 13.7 04/15/2022    HGB 16.5 04/15/2022    HCT 49.4 04/15/2022    MCV 81 04/15/2022     04/15/2022     Lab Results   Component Value Date    AST 24 04/15/2022     Lab Results   Component Value Date    ALT 26 04/15/2022     Lab Results   Component Value Date    BILITOTAL 0.4 04/15/2022    BILITOTAL 0.3 02/17/2022        Pertinent Radiology  Radiology Results: Personally reviewed image/s and Personally reviewed impression/s  EKG Results: not reviewed.

## 2022-04-15 NOTE — H&P
"[facility] MEDICINE ADMISSION HISTORY AND PHYSICAL     Assessment & Plan       Ramiro Kessler is a 41 year old old male with history of COVID-19 requiring hospitalizationcomplications and gallstones diagnosed 2 months ago, who presented with right upper quadrant pain that started at 0300 today. Being admitted for acute cholecystitis with cholelithiasis. Ultrasound shows acute cholecystitis and hepatomegaly. Surgery consulted, and he will have a laparoscopic cholecystectomy today.    Acute Cholecystitis with cholelithiasis  N.p.o. IV fluids, pain control, zosyn  Lft's lipase normal  Surgery consultation appreciated    Hepatomegaly/fatty liver  Former drinker  He has not had a drink in 2 months    H/o COVID-19 infection  - Not needing supplemental oxygen, 95% on RA  - Lung sounds clear  Encourage IS    Possible Sleep Apnea  Obesity BMI 38  Admits to snoring.   - Recommended outpatient sleep study     Clinically Significant Risk Factors Present on Admission            # Anion Gap Metabolic Acidosis: AG = 15 mmol/L (Ref range: 5 - 18 mmol/L) on admission, will monitor and treat as appropriate       # Obesity: Estimated body mass index is 38.45 kg/m  as calculated from the following:    Height as of this encounter: 1.778 m (5' 10\").    Weight as of this encounter: 121.6 kg (268 lb).        DVTP: Mechanical Prophylaxis/ Sequential Compression Devices  Code Status: No Order - Full Code   Disposition: Observation   Expected LOS: 1 day  Goals for the hospitalization: Cholecystectomy and improvement in abdominal pain  Disposition Plan   Expected Discharge: Tonight or tomorrow.  Anticipated discharge location:  Awaiting care coordination huddle  Delays: Cholecystectomy, postsurgical cares       The patient's care was discussed with the Attending Physician, Dr. See, Patient and Patient's Family.  Chief Complaint Right upper quadrant pain     HISTORY     Ramiro Kessler is a 41 year old old male with " "h/o COVID complications requiring Pulmonary Rehab, who p/w right upper quadrant pain that started at 0300 today. About two months ago, he was seen at [facility] and hospitalized for COVID complications, where they found gallstones on imaging. He denies abdominal pain prior to this morning. Pain is mostly in the RUQ that is \"excruciating\" and radiates to his stomach. Rates pain as 8/10 without pain medication, and 4/10 with pain medication. He has not eaten or drank anything since last night. Admits to nausea. No change in bowels.     He has been completing Pulmonary Rehab for a few months, s/p COVID-19. He was requiring oxygen with activity, but has not needed to for the past five weeks. Takes albuterol as needed for SOB. Oxygen would drop to 86-90% when going up one flight of stairs.     Past Medical History      H/o COVID-19 infection     Surgical History     Past Surgical History:   Procedure Laterality Date     NO PAST SURGERIES       Family History    Reviewed, and   Family History   Problem Relation Age of Onset     Prostate Cancer Maternal Grandfather         Social History      Social History     Tobacco Use     Smoking status: Never Smoker     Smokeless tobacco: Never Used   Substance Use Topics     Alcohol use: Not Currently     Comment: No alcohol for the past 4 months        Allergies   No Known Allergies  Prior to Admission Medications      Prior to Admission Medications   Prescriptions Last Dose Informant Patient Reported? Taking?   LORazepam (ATIVAN) 0.5 MG tablet   Yes Yes   Sig: Take 0.5 mg by mouth nightly as needed for anxiety   albuterol (PROAIR HFA/PROVENTIL HFA/VENTOLIN HFA) 108 (90 Base) MCG/ACT inhaler Past Month at Unknown time  Yes Yes   Sig: Inhale 1-2 puffs into the lungs every 4 hours as needed for shortness of breath / dyspnea or wheezing      Facility-Administered Medications: None      Review of Systems     A 12 point comprehensive review of systems was negative except as noted above " in HPI.    PHYSICAL EXAMINATION       Vitals      Temp:  [97.7  F (36.5  C)] 97.7  F (36.5  C)  Pulse:  [] 106  Resp:  [20] 20  BP: (136-150)/(72-94) 136/78  SpO2:  [95 %-98 %] 96 %    Examination     GENERAL:  Alert, appears comfortable, in no acute distress, appears stated age   HEAD:  Normocephalic, without obvious abnormality, atraumatic   EYES:  PERRL, conjunctiva/corneas clear, no scleral icterus   THROAT: Lips, mucosa, and tongue, gums normal, mouth moist   NECK: Supple, symmetrical, trachea midline   BACK:   Symmetric, no curvature, ROM normal   LUNGS:   Clear to auscultation bilaterally, no rales, rhonchi, or wheezing, symmetric chest rise on inhalation, respirations unlabored   CHEST WALL:  No tenderness or deformity   HEART:  Regular rate and rhythm, S1 and S2 normal, no murmur, rub, or gallop    ABDOMEN:   Soft, tender in RUQ, bowel sounds active all four quadrants, no masses, no organomegaly, no rebound or guarding   EXTREMITIES: Extremities normal, atraumatic, no cyanosis or edema    SKIN: Dry to touch, no exanthems in the visualized areas   NEURO: Alert, oriented x 4, moves all four extremities freely, non-focal   PSYCH: Cooperative, behavior is appropriate      Pertinent Lab     Most Recent 3 CBC's:Recent Labs   Lab Test 04/15/22  0728 02/17/22  1304   WBC 13.7* 13.2*   HGB 16.5 15.4   MCV 81 83   * 648*     Most Recent 3 BMP's:Recent Labs   Lab Test 04/15/22  0728 02/17/22  1428    141   POTASSIUM 4.2 4.2   CHLORIDE 103 107   CO2 21* 26   BUN 11 7*   CR 0.93 0.78   ANIONGAP 15 8   ISABELLE 9.6 9.6   * 102     Most Recent 2 LFT's:Recent Labs   Lab Test 04/15/22  0728 02/17/22  1428   AST 24 19   ALT 26 29   ALKPHOS 71 59   BILITOTAL 0.4 0.3     Most Recent 6 glucoses:Recent Labs   Lab Test 04/15/22  0728 02/17/22  1428   * 102         Pertinent Radiology     Radiology Results:   Recent Results (from the past 24 hour(s))   US Abdomen Limited (RUQ)    Narrative    EXAM: US  ABDOMEN LIMITED  LOCATION: [facility]  DATE/TIME: 4/15/2022 7:48 AM    INDICATION: pain  COMPARISON: 02/17/2022  TECHNIQUE: Limited abdominal ultrasound.    FINDINGS:    GALLBLADDER: Cholelithiasis. There is gallbladder wall thickening and a positive sonographic Varner's sign.    BILE DUCTS: No biliary dilatation. The common duct measures 4 mm.    LIVER: Increased echogenicity from diffuse fatty infiltration. Hepatomegaly. No focal mass.    RIGHT KIDNEY: No hydronephrosis.    PANCREAS: The visualized portions are normal.    No ascites.      Impression    IMPRESSION:  1.  Findings suggestive of acute calculus cholecystitis.  2.  Hepatic steatosis and hepatomegaly.         EKG Results: personally reviewed.  Sinus rhythm no acute ST T wave changes       MOUSTAPHA Zuñiga  Hospitalist  Hospital Medicine  [facility]   Phone: #[phone]    I agree with the documentation and findings as written by Delilah GERARD and our discussed and formulated assessment and plan. I was present with her who participated in the service and documentation of the note. I have also personally verified the history and personally performed the physical exam and medical decision-making. I agree with the assessment and plan of care as documented in the note.     Flori See MD  Internal Medicine  Hospitalist  [facility]  Phone: #[phone]

## 2022-04-15 NOTE — PHARMACY-ADMISSION MEDICATION HISTORY
Pharmacy Note - Admission Medication History    Pertinent Provider Information:      ______________________________________________________________________    Prior To Admission (PTA) med list completed and updated in EMR.       PTA Med List   Medication Sig Last Dose     albuterol (PROAIR HFA/PROVENTIL HFA/VENTOLIN HFA) 108 (90 Base) MCG/ACT inhaler Inhale 1-2 puffs into the lungs every 4 hours as needed for shortness of breath / dyspnea or wheezing Past Month at Unknown time     LORazepam (ATIVAN) 0.5 MG tablet Take 0.5 mg by mouth nightly as needed for anxiety        Information source(s): Patient and CareEverywhere/SureScripts  Method of interview communication: in-person    Summary of Changes to PTA Med List  New: albuterol, lorazepam  Discontinued: benzonatate, famotidine  Changed: none    Patient was asked about OTC/herbal products specifically.  PTA med list reflects this.      Allergies were reviewed, assessed, and updated with the patient.      Patient does not anticipate needing any multi-use medications during admission.    The information provided in this note is only as accurate as the sources available at the time of the update(s).    Thank you for the opportunity to participate in the care of this patient.    Priscilla Ksesler RPH  4/15/2022 9:12 AM

## 2022-04-15 NOTE — OP NOTE
Operative Note    Name:  Ramiro Kessler  PCP:  Nicholas Gallegos  Procedure Date:  4/15/2022       Procedure:  Procedure(s):  CHOLECYSTECTOMY, LAPAROSCOPIC     Pre-Procedure Diagnosis:  Cholecystitis [K81.9]     Post-Procedure Diagnosis:    Same     Surgeon(s):  Flores Olmedo MD     Assistant: None      Anesthesia Type:  General       Findings:  Distended gallbladder full of stones.    Operative Report:    The patient was properly identified and brought to the operating suite where they were placed in the supine position, general anesthetic was administered and prepped and draped in a sterile fashion.  A small incision was made below the umbilicus and a Veress needle passed into the abdominal cavity which was insufflated with carbon dioxide.  A 5mm trocar port was then placed followed by the camera.  Under direct vision a 10 mm port was placed in the epigastrium and two 5 mm ports in the right upper quadrant.  The gallbladder was visualized.  It was quite distended and the gallbladder wall was somewhat thickened.  With traction on the gallbladder dissection was carried out in the triangle of Calot where the cystic duct and artery were carefully identified, dissected free, clipped and divided.  The gallbladder was removed from the gallbladder bed with electrocautery with no difficulty and pulled up through the epigastric incision.  The port was replaced and the entire area irrigated until clear.  Hemostasis was assured.  All the ports removed and each site closed with subcuticular sutures of 4-0 Monocryl.  Sterile dressings were placed.  Each site was also infiltrated with quarter percent Marcaine for a total of 40 mL.  The patient tolerated the procedure well.    Estimated Blood Loss:   5 cc    Specimens:    ID Type Source Tests Collected by Time Destination   1 :  Tissue Gallbladder with Stone(s) SURGICAL PATHOLOGY EXAM Flores Olmedo MD 4/15/2022  2:42 PM            Drains:   NG/OG/NJ Tube Orogastric 18 fr  Center mouth (Active)       Complications:    None    Flores Olmedo MD     Date: 4/15/2022  Time: 3:08 PM

## 2022-04-15 NOTE — ANESTHESIA CARE TRANSFER NOTE
Patient: Ramiro Kessler    Procedure: Procedure(s):  CHOLECYSTECTOMY, LAPAROSCOPIC       Diagnosis: Cholecystitis [K81.9]  Diagnosis Additional Information: No value filed.    Anesthesia Type:   General     Note:    Oropharynx: oropharynx clear of all foreign objects  Level of Consciousness: drowsy  Oxygen Supplementation: face mask  Level of Supplemental Oxygen (L/min / FiO2): 8  Independent Airway: airway patency satisfactory and stable  Dentition: dentition unchanged  Vital Signs Stable: post-procedure vital signs reviewed and stable  Report to RN Given: handoff report given  Patient transferred to: PACU    Handoff Report: Identifed the Patient, Identified the Reponsible Provider, Reviewed the pertinent medical history, Discussed the surgical course, Reviewed Intra-OP anesthesia mangement and issues during anesthesia, Set expectations for post-procedure period and Allowed opportunity for questions and acknowledgement of understanding      Vitals:  Vitals Value Taken Time   /86 04/15/22 1520   Temp 97.2f    Pulse 99 04/15/22 1521   Resp 22 04/15/22 1521   SpO2 98 % 04/15/22 1521   Vitals shown include unvalidated device data.    Electronically Signed By: MARGOT SMITH CRNA  April 15, 2022  3:22 PM

## 2022-04-15 NOTE — ED PROVIDER NOTES
"EMERGENCY DEPARTMENT ENCOUNTER            IMPRESSION:  Acute cholecystitis      MEDICAL DECISION MAKING:  Patient evaluated for right upper quadrant abdominal pain.  He has a history of Covid with some persistent pulmonary insufficiency    On exam he appears uncomfortable.  Slightly tachycardic.  Lungs are somewhat diminished.  He is tender right upper quadrant.    An IV was started and he was given pain medication IV fluids    EKG does not show evidence of ischemia    White blood cell count is elevated    Chemistry is unremarkable    Urine drug screen shows presence of opiates which were administered in the emergency department.  The urine drug screen was erroneously ordered.  I intended on ordering urinalysis.     Lactic acid and lipase are normal.    Ultrasound shows evidence of acute cholecystitis.    Results were discussed with the patient.  IV antibiotics were ordered    I spoke with on-call surgery.  I have paged the hospitalist      =================================================================  CHIEF COMPLAINT:  Chief Complaint   Patient presents with     Abdominal Pain         HPI  Ramiro Kessler is a 41 year old male with a noncontributory past medical history who presents to the ED by private auto, accompanied by brother, for evaluation of abdominal pain.     Patient reports being woken by sharp right upper quadrant abdominal pain at ~3:00AM this morning. Since onset, the pain has been constant and he repeatedly describes it as \"excruciating.\" He denies any associated nausea or vomit. He has not had any food or water intake due to the pain. Patient's pain is provoked more with palpation. He states the pain radiates throughout the stomach, but is described as sore and bloating outside of the right upper quadrant region. He also notes experiencing chills and feverish, with no objective fever. Currently, he reports he \"don't feel good at all\" and cannot find a position to relieve pain.     Patient did " have noted gall stones ~1.5 weeks ago. He also describes a history of kidney stones but reports his pain today is different. He denies a history of abdominal surgeries. He further reports having COVID ~3 months ago, being in the ICU for 21 days. He is still in recovery, attending rehab every Tuesday and Thursday. Patient stopped blood thinners 3 weeks ago, for which he was on for preventative measures. He denies a past medical history of pulmonary or cardiac issues. He denies any recent sick contacts. Patient denies back pain, changes in stool, changes in urinary patterns, chest pain, leg swelling, or any other complaints at his time.     I, Haley Contreras am serving as a scribe to document services personally performed by Dr. Conor Nova MD, based on my observation and the provider's statements to me. I, Dr. Conor Nova MD attest that Haley Contreras is acting in a scribe capacity, has observed my performance of the services and has documented them in accordance with my direction.      REVIEW OF SYSTEMS   Constitutional: Denies unintentional weight loss or fatigue. Positive for chills, feverish.   Eyes: Denies visual changes or discharge    HENT: Denies sore throat, ear pain or neck pain  Respiratory: Denies cough or shortness of breath    Cardiovascular: Denies chest pain, palpitations or leg swelling  GI: Denies nausea, vomiting, or dark, bloody stools. Positive for RUQ pain.   : Denies hematuria, dysuria, or flank pain  Musculoskeletal: Denies any new back pain or new muscle/joint pains  Skin: Denies rash or wound  Neurologic: Denies current headache, new weakness, focal weakness, or sensory changes    Lymphatic: Denies swollen glands    Psychiatric: Denies depression, suicidal ideation or homicidal ideation.    Remainder of systems reviewed, unless noted in HPI all others negative.      PAST MEDICAL HISTORY:  History reviewed. No pertinent past medical history.    PAST SURGICAL HISTORY:  Past Surgical History:  "  Procedure Laterality Date     NO PAST SURGERIES           CURRENT MEDICATIONS:    benzonatate (TESSALON) 200 MG capsule  famotidine (PEPCID) 40 MG tablet        ALLERGIES:  No Known Allergies    FAMILY HISTORY:  History reviewed. No pertinent family history.    SOCIAL HISTORY:   Social History     Socioeconomic History     Marital status:    Tobacco Use     Smoking status: Never Smoker     Smokeless tobacco: Never Used   Substance and Sexual Activity     Alcohol use: Yes       PHYSICAL EXAM:    BP (!) 140/80   Pulse 109   Temp 97.7  F (36.5  C) (Oral)   Resp 20   Ht 1.778 m (5' 10\")   Wt 121.6 kg (268 lb)   SpO2 97%   BMI 38.45 kg/m      Constitutional: Awake, alert, in no acute distress  Head: Normocephalic, atraumatic.  ENT: Mucous membranes moist. Posterior oropharynx appears normal.  Eyes: Pupils midrange and reactive ,no conjunctival discharge  Neck: No lymphadenopathy, no stridor, supple, no soft tissue swelling  Chest: No tenderness   Respiratory: Respirations even, unlabored. Lungs clear to ascultation bilaterally, in no acute respiratory distress.  Cardiovascular: Regular rate and rhythm.+2 radial pulses, equal bilaterally.  No murmurs.   GI: Abdomen soft, tender right upper quadrant no guarding or rebound. Bowel sounds intact on all 4 quadrants.   Back: No CVA tenderness.    Musculoskeletal: Moves all 4 extremities equally, strength symmetrical on bilateral uppers and lowers.  No peripheral edema  Integument: Warm, dry. No rash. No bruising or petechiae.  Lymphatic: No cervical lymphadenopathy  Neurologic: Alert & oriented x 3. Normal speech. Grossly normal motor and sensory function. No focal deficits noted.    Psychiatric: Normal mood and affect. Normal judgement.    ED COURSE:    7:39 AM I met with the patient, obtained history, performed an initial exam, and discussed options and plan for diagnostics and treatment here in the ED.  8:30 AM Updated patient concerning result and plan for " admission.   8:42 AM Spoke with Dr. Olmedo.     LAB:  All pertinent labs reviewed and interpreted.  Results for orders placed or performed during the hospital encounter of 04/15/22   US Abdomen Limited (RUQ)    Impression    IMPRESSION:  1.  Findings suggestive of acute calculus cholecystitis.  2.  Hepatic steatosis and hepatomegaly.       Comprehensive metabolic panel   Result Value Ref Range    Sodium 139 136 - 145 mmol/L    Potassium 4.2 3.5 - 5.0 mmol/L    Chloride 103 98 - 107 mmol/L    Carbon Dioxide (CO2) 21 (L) 22 - 31 mmol/L    Anion Gap 15 5 - 18 mmol/L    Urea Nitrogen 11 8 - 22 mg/dL    Creatinine 0.93 0.70 - 1.30 mg/dL    Calcium 9.6 8.5 - 10.5 mg/dL    Glucose 147 (H) 70 - 125 mg/dL    Alkaline Phosphatase 71 45 - 120 U/L    AST 24 0 - 40 U/L    ALT 26 0 - 45 U/L    Protein Total 7.5 6.0 - 8.0 g/dL    Albumin 4.4 3.5 - 5.0 g/dL    Bilirubin Total 0.4 0.0 - 1.0 mg/dL    GFR Estimate >90 >60 mL/min/1.73m2   Result Value Ref Range    Lipase 19 0 - 52 U/L   Lactic acid whole blood   Result Value Ref Range    Lactic Acid 1.4 0.7 - 2.0 mmol/L   Extra Blue Top Tube   Result Value Ref Range    Hold Specimen JIC    Extra Red Top Tube   Result Value Ref Range    Hold Specimen JIC    Extra Green Top (Lithium Heparin) Tube   Result Value Ref Range    Hold Specimen JIC    Extra Purple Top Tube   Result Value Ref Range    Hold Specimen JIC    CBC with platelets and differential   Result Value Ref Range    WBC Count 13.7 (H) 4.0 - 11.0 10e3/uL    RBC Count 6.11 (H) 4.40 - 5.90 10e6/uL    Hemoglobin 16.5 13.3 - 17.7 g/dL    Hematocrit 49.4 40.0 - 53.0 %    MCV 81 78 - 100 fL    MCH 27.0 26.5 - 33.0 pg    MCHC 33.4 31.5 - 36.5 g/dL    RDW 14.6 10.0 - 15.0 %    Platelet Count 741 (H) 150 - 450 10e3/uL    % Neutrophils 73 %    % Lymphocytes 21 %    % Monocytes 5 %    % Eosinophils 1 %    % Basophils 0 %    % Immature Granulocytes 0 %    NRBCs per 100 WBC 0 <1 /100    Absolute Neutrophils 10.0 (H) 1.6 - 8.3 10e3/uL     Absolute Lymphocytes 2.8 0.8 - 5.3 10e3/uL    Absolute Monocytes 0.7 0.0 - 1.3 10e3/uL    Absolute Eosinophils 0.1 0.0 - 0.7 10e3/uL    Absolute Basophils 0.1 0.0 - 0.2 10e3/uL    Absolute Immature Granulocytes 0.1 <=0.4 10e3/uL    Absolute NRBCs 0.0 10e3/uL   Drugs of Abuse 1+ Panel, Urine ([facility] Only)   Result Value Ref Range    Amphetamines Urine Screen Negative Screen Negative    Benzodiazepines Urine Screen Negative Screen Negative    Opiates Urine Screen Positive (A) Screen Negative    PCP Urine Screen Negative Screen Negative    Cannabinoids Urine Screen Negative Screen Negative    Barbiturates Urine Screen Negative Screen Negative    Cocaine Urine Screen Negative Screen Negative    Methadone Urine Screen Negative Screen Negative    Oxycodone Urine Screen Negative Screen Negative    Creatinine Urine mg/dL 220 mg/dL       RADIOLOGY:  Reviewed all pertinent imaging. Please see official radiology report.  US Abdomen Limited (RUQ)   Final Result   IMPRESSION:   1.  Findings suggestive of acute calculus cholecystitis.   2.  Hepatic steatosis and hepatomegaly.                 EKG:    Performed at: 07:44:14    Impression: Sinus rhythm. Normal ECG.    Rate: 100  Rhythm: Sinus  Axis: 54  CO interval: 182  QRS Interval: 102  QTc Interval: 469  ST Changes: None  Comparsion: When compared with ECG of 17-FEB-2022 13:23, no significant change was found.     I have independently reviewed and interpreted the EKG(s) documented above.      MEDICATIONS GIVEN IN THE EMERGENCY:  Medications   0.9% sodium chloride BOLUS (1,000 mLs Intravenous New Bag 4/15/22 0745)     Followed by   sodium chloride 0.9% infusion (has no administration in time range)   ondansetron (ZOFRAN) injection 4 mg (has no administration in time range)   morphine (PF) injection 4 mg (4 mg Intravenous Given 4/15/22 6140)   piperacillin-tazobactam (ZOSYN) 3.375 g vial to attach to  mL bag (3.375 g Intravenous New Bag 4/15/22 0900)   ketorolac (TORADOL)  injection 15 mg (15 mg Intravenous Given 4/15/22 0733)           NEW PRESCRIPTIONS STARTED AT TODAY'S ER VISIT:  New Prescriptions    No medications on file          FINAL DIAGNOSIS:    ICD-10-CM    1. Cholecystitis  K81.9 Case Request: CHOLECYSTECTOMY, LAPAROSCOPIC     Case Request: CHOLECYSTECTOMY, LAPAROSCOPIC   2. Acute cholecystitis  K81.0             At the conclusion of the encounter I discussed the results of all of the tests and the disposition. The questions were answered. The patient or family acknowledged understanding and was agreeable with the care plan.     NAME: Ramiro Kessler  AGE: 41 year old male  YOB: 1980  MRN: [id number]  EVALUATION DATE & TIME: 4/15/2022  6:57 AM    PCP: Ping Zuniga    ED PROVIDER: Conor Nova M.D.      IHaley, am serving as a scribe to document services personally performed by Dr. Conor Nova based on my observation and the provider's statements to me. I, Conor Nova MD attest that Haley Contreras is acting in a scribe capacity, has observed my performance of the services and has documented them in accordance with my direction.    Conor Nova M.D.  Emergency Medicine  [facility] EMERGENCY ROOM  [address] [id number] [phone]  Dept: [phone]  4/15/2022       Conor Nova MD  04/15/22 0901

## 2022-04-15 NOTE — ANESTHESIA PROCEDURE NOTES
Airway       Patient location during procedure: OR       Procedure Start/Stop Times: 4/15/2022 2:10 PM  Staff -        Anesthesiologist:  Austin Liu MD       CRNA: Enid Gan APRN CRNA       Performed By: CRNA  Consent for Airway        Urgency: elective  Indications and Patient Condition       Indications for airway management: laisha-procedural       Induction type:intravenous       Mask difficulty assessment: 0 - not attempted    Final Airway Details       Final airway type: endotracheal airway       Successful airway: ETT - single  Endotracheal Airway Details        ETT size (mm): 8.0       Cuffed: yes       Successful intubation technique: direct laryngoscopy       DL Blade Type: Coyne 2       Grade View of Cords: 1       Adjucts: stylet and tooth guard       Position: Right       Measured from: gums/teeth       Secured at (cm): 23       Bite block used: None    Post intubation assessment        Placement verified by: capnometry, equal breath sounds and chest rise        Number of attempts at approach: 1       Number of other approaches attempted: 0       Secured with: silk tape       Ease of procedure: easy       Dentition: Intact and Unchanged    Medication(s) Administered   Medication Administration Time: 4/15/2022 2:10 PM

## 2022-04-15 NOTE — ANESTHESIA POSTPROCEDURE EVALUATION
Patient: Ramiro Kessler    Procedure: Procedure(s):  CHOLECYSTECTOMY, LAPAROSCOPIC       Anesthesia Type:  General    Note:  Disposition: Outpatient   Postop Pain Control: Uneventful            Sign Out: Well controlled pain   PONV: No   Neuro/Psych: Uneventful            Sign Out: Acceptable/Baseline neuro status   Airway/Respiratory: Uneventful            Sign Out: Acceptable/Baseline resp. status   CV/Hemodynamics: Uneventful            Sign Out: Acceptable CV status; No obvious hypovolemia; No obvious fluid overload   Other NRE:    DID A NON-ROUTINE EVENT OCCUR? No           Last vitals:  Vitals Value Taken Time   /98 04/15/22 1541   Temp 36.2  C (97.2  F) 04/15/22 1518   Pulse 103 04/15/22 1548   Resp 19 04/15/22 1548   SpO2 91 % 04/15/22 1548   Vitals shown include unvalidated device data.    Electronically Signed By: Austin Liu MD  April 15, 2022  3:50 PM

## 2022-04-15 NOTE — ANESTHESIA PREPROCEDURE EVALUATION
Anesthesia Pre-Procedure Evaluation    Patient: Ramiro Kessler   MRN: [id number] : 1980        Procedure : Procedure(s):  CHOLECYSTECTOMY, LAPAROSCOPIC          History reviewed. No pertinent past medical history.   Past Surgical History:   Procedure Laterality Date     NO PAST SURGERIES        No Known Allergies   Social History     Tobacco Use     Smoking status: Never Smoker     Smokeless tobacco: Never Used   Substance Use Topics     Alcohol use: Not Currently     Comment: No alcohol for the past 4 months      Wt Readings from Last 1 Encounters:   04/15/22 123.5 kg (272 lb 4 oz)        Anesthesia Evaluation            ROS/MED HX  ENT/Pulmonary:  - neg pulmonary ROS     Neurologic:  - neg neurologic ROS     Cardiovascular:  - neg cardiovascular ROS     METS/Exercise Tolerance:     Hematologic:  - neg hematologic  ROS     Musculoskeletal:  - neg musculoskeletal ROS     GI/Hepatic:  - neg GI/hepatic ROS     Renal/Genitourinary:  - neg Renal ROS     Endo:       Psychiatric/Substance Use:  - neg psychiatric ROS     Infectious Disease:  - neg infectious disease ROS     Malignancy:  - neg malignancy ROS     Other:  - neg other ROS          Physical Exam    Airway  airway exam normal      Mallampati: II       Respiratory Devices and Support         Dental  no notable dental history         Cardiovascular   cardiovascular exam normal          Pulmonary   pulmonary exam normal                OUTSIDE LABS:  CBC:   Lab Results   Component Value Date    WBC 13.7 (H) 04/15/2022    WBC 13.2 (H) 2022    HGB 16.5 04/15/2022    HGB 15.4 2022    HCT 49.4 04/15/2022    HCT 47.5 2022     (H) 04/15/2022     (H) 2022     BMP:   Lab Results   Component Value Date     04/15/2022     2022    POTASSIUM 4.2 04/15/2022    POTASSIUM 4.2 2022    CHLORIDE 103 04/15/2022    CHLORIDE 107 2022    CO2 21 (L) 04/15/2022    CO2 26 2022    BUN 11 04/15/2022    BUN 7  (L) 02/17/2022    CR 0.93 04/15/2022    CR 0.78 02/17/2022     (H) 04/15/2022     02/17/2022     COAGS: No results found for: PTT, INR, FIBR  POC: No results found for: BGM, HCG, HCGS  HEPATIC:   Lab Results   Component Value Date    ALBUMIN 4.4 04/15/2022    PROTTOTAL 7.5 04/15/2022    ALT 26 04/15/2022    AST 24 04/15/2022    ALKPHOS 71 04/15/2022    BILITOTAL 0.4 04/15/2022     OTHER:   Lab Results   Component Value Date    LACT 1.4 04/15/2022    ISABELLE 9.6 04/15/2022    LIPASE 19 04/15/2022       Anesthesia Plan    ASA Status:  3      Anesthesia Type: General.     - Airway: ETT              Consents    Anesthesia Plan(s) and associated risks, benefits, and realistic alternatives discussed. Questions answered and patient/representative(s) expressed understanding.    - Discussed:     - Discussed with:  Patient         Postoperative Care            Comments:                Nicholas Stoll MD

## 2022-04-18 ENCOUNTER — TELEPHONE (OUTPATIENT)
Dept: SURGERY | Facility: CLINIC | Age: 42
End: 2022-04-18
Payer: COMMERCIAL

## 2022-04-18 NOTE — TELEPHONE ENCOUNTER
[facility] Post-Op Phone Call                     Surgeon: Flores Olmedo MD    Date of Surgery: Laparoscopic Cholecystectomy  04/15/2022  Discharge Date: 4/15/2022    Date/Time Called:   Date: 4/18/2022 Time: 9:19 AM   Attempt: First Left message for patient to call back.    Pain Control:  Intensity: Mild (1 - 3)  Duration/Location/Explain: Umbilical incision  What makes it better/worse? Resting    Medications:  Narcotic Use - Yes  Drug type: Oxycodone  Frequency: PRN    Incisions:  Drainage? clean and dry  Any fever type symptoms? No    GI:  Nausea? No  Vomiting? No  BM? Yes  Gas? Yes  Voiding Frequency? 4 or more/day   Appetite? Fair    Activity:  Walking activity? Yes  Frequency/Type: Throughout the day   Restrictions: x 1 week     Return to Work Plans?  Expected date: 4/17/2022  Do you need anything from us in this regard? No     Post-op appointment made? No     Patient doing well after surgery. He does notice an area right above to the left of his umbilicus to have a bulge and pain at times that comes and goes. He does have a lot of bruising in that area. Incisions are healing up well. He has not been diagnosed with a hernia in the past. I told him if this did not go away in a couple weeks or causes more pain to call us back. He expressed understanding.          [facility]      Carin Vargas RN  [facility]  General Surgery  [address] [place] [address] [address] [place] [address]  [email or web address]   Office:[phone]  Employed by [employer] [facility],            Call completed by: Carin Vargas RN

## 2022-04-19 LAB
PATH REPORT.COMMENTS IMP SPEC: NORMAL
PATH REPORT.COMMENTS IMP SPEC: NORMAL
PATH REPORT.FINAL DX SPEC: NORMAL
PATH REPORT.GROSS SPEC: NORMAL
PATH REPORT.MICROSCOPIC SPEC OTHER STN: NORMAL
PATH REPORT.RELEVANT HX SPEC: NORMAL
PHOTO IMAGE: NORMAL

## 2022-04-19 PROCEDURE — 88304 TISSUE EXAM BY PATHOLOGIST: CPT | Mod: 26 | Performed by: PATHOLOGY

## 2022-04-20 NOTE — TELEPHONE ENCOUNTER
Ramiro returning the call.  He also has some questions regarding his surgery. Having some pain and some bulging.    Please call him back.    Thanks!

## 2023-10-09 ENCOUNTER — NURSE TRIAGE (OUTPATIENT)
Dept: NURSING | Facility: CLINIC | Age: 43
End: 2023-10-09
Payer: COMMERCIAL

## 2023-10-09 NOTE — TELEPHONE ENCOUNTER
"Has had nasal congestion, chills, H/A, nasal pressure , sore throat,Cough drainage related.  Ear pressure x 4 days. Mild SOB but does have residual from prior COVID 2 years ago. Oxygen sats 96-97 %. Also achy. 97.7 po. Sinus pain is \"2-3\", Nasal discharge yellow. Mild H/A from congestion. Main complaint is sinus pressure, has reddness on cheeks. Pain present. Covid Negative last pm. Does have Neti pot but has not yet tried it.     Protocol reviewed, care advice given. Disposition: Go to office now. Transferred to Scheduling, if no openings, to go to [facility] for evaluation. Call back with worsening symptoms, further questions/concerns.     TALIA AVILA RN  [facility] nurse advisors  10/9/2023  8:46 AM  Additional Information   Negative: Sounds like a life-threatening emergency to the triager   Negative: Difficulty breathing, and not from stuffy nose (e.g., not relieved by cleaning out the nose)   Negative: SEVERE headache and has fever   Negative: Patient sounds very sick or weak to the triager   Negative: SEVERE sinus pain   Negative: Severe headache   Negative: Fever > 103 F (39.4 C)   Redness or swelling on the cheek, forehead, or around the eye    Protocols used: Sinus Pain and Congestion-A-OH    "

## 2023-11-11 ENCOUNTER — APPOINTMENT (OUTPATIENT)
Dept: CT IMAGING | Facility: CLINIC | Age: 43
End: 2023-11-11
Attending: EMERGENCY MEDICINE
Payer: COMMERCIAL

## 2023-11-11 ENCOUNTER — HOSPITAL ENCOUNTER (EMERGENCY)
Facility: CLINIC | Age: 43
Discharge: HOME | End: 2023-11-11
Attending: EMERGENCY MEDICINE | Admitting: EMERGENCY MEDICINE
Payer: COMMERCIAL

## 2023-11-11 VITALS
RESPIRATION RATE: 24 BRPM | OXYGEN SATURATION: 95 % | TEMPERATURE: 98.3 F | DIASTOLIC BLOOD PRESSURE: 84 MMHG | HEART RATE: 97 BPM | SYSTOLIC BLOOD PRESSURE: 136 MMHG | BODY MASS INDEX: 39.06 KG/M2 | HEIGHT: 70 IN

## 2023-11-11 DIAGNOSIS — N20.0 KIDNEY STONE: ICD-10-CM

## 2023-11-11 LAB
ALBUMIN UR-MCNC: NEGATIVE MG/DL
ANION GAP SERPL CALCULATED.3IONS-SCNC: 11 MMOL/L (ref 7–15)
APPEARANCE UR: ABNORMAL
BACTERIA #/AREA URNS HPF: ABNORMAL /HPF
BILIRUB UR QL STRIP: NEGATIVE
BUN SERPL-MCNC: 14.9 MG/DL (ref 6–20)
CALCIUM SERPL-MCNC: 10.5 MG/DL (ref 8.6–10)
CHLORIDE SERPL-SCNC: 104 MMOL/L (ref 98–107)
COLOR UR AUTO: ABNORMAL
CREAT SERPL-MCNC: 0.99 MG/DL (ref 0.67–1.17)
DEPRECATED HCO3 PLAS-SCNC: 25 MMOL/L (ref 22–29)
EGFRCR SERPLBLD CKD-EPI 2021: >90 ML/MIN/1.73M2
ERYTHROCYTE [DISTWIDTH] IN BLOOD BY AUTOMATED COUNT: 14.3 % (ref 10–15)
GLUCOSE SERPL-MCNC: 140 MG/DL (ref 70–99)
GLUCOSE UR STRIP-MCNC: NEGATIVE MG/DL
HCT VFR BLD AUTO: 47.4 % (ref 40–53)
HGB BLD-MCNC: 15.9 G/DL (ref 13.3–17.7)
HGB UR QL STRIP: ABNORMAL
KETONES UR STRIP-MCNC: NEGATIVE MG/DL
LEUKOCYTE ESTERASE UR QL STRIP: NEGATIVE
MCH RBC QN AUTO: 27.7 PG (ref 26.5–33)
MCHC RBC AUTO-ENTMCNC: 33.5 G/DL (ref 31.5–36.5)
MCV RBC AUTO: 83 FL (ref 78–100)
NITRATE UR QL: NEGATIVE
PH UR STRIP: 6.5 [PH] (ref 5–7)
PLATELET # BLD AUTO: 645 10E3/UL (ref 150–450)
POTASSIUM SERPL-SCNC: 4 MMOL/L (ref 3.4–5.3)
RBC # BLD AUTO: 5.74 10E6/UL (ref 4.4–5.9)
RBC URINE: >182 /HPF
SODIUM SERPL-SCNC: 140 MMOL/L (ref 135–145)
SP GR UR STRIP: 1.01 (ref 1–1.03)
UROBILINOGEN UR STRIP-MCNC: <2 MG/DL
WBC # BLD AUTO: 11.7 10E3/UL (ref 4–11)
WBC URINE: 0 /HPF

## 2023-11-11 PROCEDURE — 96375 TX/PRO/DX INJ NEW DRUG ADDON: CPT

## 2023-11-11 PROCEDURE — 36415 COLL VENOUS BLD VENIPUNCTURE: CPT | Performed by: EMERGENCY MEDICINE

## 2023-11-11 PROCEDURE — 81001 URINALYSIS AUTO W/SCOPE: CPT | Performed by: EMERGENCY MEDICINE

## 2023-11-11 PROCEDURE — 96374 THER/PROPH/DIAG INJ IV PUSH: CPT | Mod: 59

## 2023-11-11 PROCEDURE — 99285 EMERGENCY DEPT VISIT HI MDM: CPT | Mod: 25

## 2023-11-11 PROCEDURE — 250N000011 HC RX IP 250 OP 636: Performed by: EMERGENCY MEDICINE

## 2023-11-11 PROCEDURE — 258N000003 HC RX IP 258 OP 636: Performed by: EMERGENCY MEDICINE

## 2023-11-11 PROCEDURE — 80048 BASIC METABOLIC PNL TOTAL CA: CPT | Performed by: EMERGENCY MEDICINE

## 2023-11-11 PROCEDURE — 96376 TX/PRO/DX INJ SAME DRUG ADON: CPT

## 2023-11-11 PROCEDURE — 96361 HYDRATE IV INFUSION ADD-ON: CPT

## 2023-11-11 PROCEDURE — 85027 COMPLETE CBC AUTOMATED: CPT | Performed by: EMERGENCY MEDICINE

## 2023-11-11 PROCEDURE — 74177 CT ABD & PELVIS W/CONTRAST: CPT

## 2023-11-11 RX ORDER — KETOROLAC TROMETHAMINE 15 MG/ML
15 INJECTION, SOLUTION INTRAMUSCULAR; INTRAVENOUS ONCE
Status: COMPLETED | OUTPATIENT
Start: 2023-11-11 | End: 2023-11-11

## 2023-11-11 RX ORDER — IOPAMIDOL 755 MG/ML
100 INJECTION, SOLUTION INTRAVASCULAR ONCE
Status: COMPLETED | OUTPATIENT
Start: 2023-11-11 | End: 2023-11-11

## 2023-11-11 RX ORDER — ONDANSETRON 4 MG/1
4 TABLET, ORALLY DISINTEGRATING ORAL EVERY 8 HOURS PRN
Qty: 10 TABLET | Refills: 0 | Status: SHIPPED | OUTPATIENT
Start: 2023-11-11 | End: 2023-11-14

## 2023-11-11 RX ORDER — ONDANSETRON 2 MG/ML
4 INJECTION INTRAMUSCULAR; INTRAVENOUS ONCE
Status: COMPLETED | OUTPATIENT
Start: 2023-11-11 | End: 2023-11-11

## 2023-11-11 RX ORDER — HYDROCODONE BITARTRATE AND ACETAMINOPHEN 5; 325 MG/1; MG/1
1 TABLET ORAL EVERY 6 HOURS PRN
Qty: 8 TABLET | Refills: 0 | Status: SHIPPED | OUTPATIENT
Start: 2023-11-11 | End: 2023-11-14

## 2023-11-11 RX ADMIN — SODIUM CHLORIDE 1000 ML: 9 INJECTION, SOLUTION INTRAVENOUS at 04:49

## 2023-11-11 RX ADMIN — KETOROLAC TROMETHAMINE 15 MG: 15 INJECTION, SOLUTION INTRAMUSCULAR; INTRAVENOUS at 06:31

## 2023-11-11 RX ADMIN — KETOROLAC TROMETHAMINE 15 MG: 15 INJECTION INTRAMUSCULAR; INTRAVENOUS at 04:51

## 2023-11-11 RX ADMIN — IOPAMIDOL 100 ML: 755 INJECTION, SOLUTION INTRAVENOUS at 05:57

## 2023-11-11 RX ADMIN — ONDANSETRON 4 MG: 2 INJECTION INTRAMUSCULAR; INTRAVENOUS at 04:48

## 2023-11-11 ASSESSMENT — ACTIVITIES OF DAILY LIVING (ADL): ADLS_ACUITY_SCORE: 35

## 2023-11-11 NOTE — DISCHARGE INSTRUCTIONS
You are found to have a kidney stone today.  Take the prescribed medications as directed and follow-up with urology on an outpatient basis.  Return to the ER for any worsening symptoms or other concerns.

## 2023-11-11 NOTE — ED PROVIDER NOTES
EMERGENCY DEPARTMENT ENCOUnter      NAME: Ramiro Kessler  AGE: 43 year old male  YOB: 1980  MRN: [id number]  EVALUATION DATE & TIME: 11/11/2023  4:08 AM    PCP: No primary care provider on file.    ED PROVIDER: Austin Ernst DO      Chief Complaint   Patient presents with    Flank Pain         FINAL IMPRESSION:  1. Kidney stone          ED COURSE & MEDICAL DECISION MAKING:    The patient presented to the emergency department today complaining of left flank pain which began suddenly.  He has a distant history of kidney stones and states that this feels similar.  On exam he appears uncomfortable but no focal notable findings.  CT imaging today shows evidence of a 3 mm mid ureteral stone on the left.  He is feeling better after pain medications.  Plan will be for discharge home with close urology follow-up and additional pain medicines for home use.  He has been instructed to return for any worsening symptoms or other concerns.    Medical Decision Making    History:  Supplemental history from: Documented in chart, if applicable  External Record(s) reviewed: Documented in chart, if applicable.    Work Up:  Chart documentation includes differential considered and any EKGs or imaging independently interpreted by provider, where specified.  In additional to work up documented, I considered the following work up: Documented in chart, if applicable.    External consultation:  Discussion of management with another provider: Documented in chart, if applicable    Complicating factors:  Care impacted by chronic illness: Other: history of nephrolithiasis  Care affected by social determinants of health: Access to Medical Care    Disposition considerations: Discharge. I prescribed additional prescription strength medication(s) as charted. I considered admission, but discharged patient after significant clinical improvement.    4:07 AM I met the patient and performed my initial interview and exam.     At the  conclusion of the encounter I discussed the results of all of the tests and the disposition. The questions were answered. The patient or family acknowledged understanding and was agreeable with the care plan.         MEDICATIONS GIVEN IN THE EMERGENCY:  Medications   ketorolac (TORADOL) injection 15 mg (15 mg Intravenous $Given 11/11/23 0451)   ondansetron (ZOFRAN) injection 4 mg (4 mg Intravenous $Given 11/11/23 5658)   sodium chloride 0.9% BOLUS 1,000 mL (0 mLs Intravenous Stopped 11/11/23 0630)   iopamidol (ISOVUE-370) solution 100 mL (100 mLs Intravenous $Given 11/11/23 0557)   ketorolac (TORADOL) injection 15 mg (15 mg Intravenous $Given 11/11/23 0631)       NEW PRESCRIPTIONS STARTED AT TODAY'S ER VISIT  Discharge Medication List as of 11/11/2023  6:33 AM        START taking these medications    Details   HYDROcodone-acetaminophen (NORCO) 5-325 MG tablet Take 1 tablet by mouth every 6 hours as needed for pain, Disp-8 tablet, R-0, E-Prescribe      ondansetron (ZOFRAN ODT) 4 MG ODT tab Take 1 tablet (4 mg) by mouth every 8 hours as needed for nausea, Disp-10 tablet, R-0, E-Prescribe                =================================================================    HPI        Ramiro Kessler is a 43 year old male with a pertinent history of nephrolithiasis, cholecystectomy, who presents to this ED via walk-in for evaluation of flank pain.    Earlier onight, patient experienced an intense flare up pf left flank pain that woke him up. He has had similar pain 20 years ago with his prior kidney stones, and for those he was given medication and passed them on his own. He states that when he had a cholecystectomy about 1.5 years ago, they noticed left-sided kidney stones.    He has no allergies to any medication.    PAST MEDICAL HISTORY:  History reviewed. No pertinent past medical history.    PAST SURGICAL HISTORY:  Past Surgical History:   Procedure Laterality Date    LAPAROSCOPIC CHOLECYSTECTOMY N/A 4/15/2022     "Procedure: CHOLECYSTECTOMY, LAPAROSCOPIC;  Surgeon: Flores Olmedo MD;  Location: [facility] Main OR    NO PAST SURGERIES             CURRENT MEDICATIONS:    HYDROcodone-acetaminophen (NORCO) 5-325 MG tablet  ondansetron (ZOFRAN ODT) 4 MG ODT tab  albuterol (PROAIR HFA/PROVENTIL HFA/VENTOLIN HFA) 108 (90 Base) MCG/ACT inhaler  LORazepam (ATIVAN) 0.5 MG tablet        ALLERGIES:  No Known Allergies    FAMILY HISTORY:  Family History   Problem Relation Age of Onset    Prostate Cancer Maternal Grandfather        SOCIAL HISTORY:   Social History     Socioeconomic History    Marital status:      Spouse name: None    Number of children: None    Years of education: None    Highest education level: None   Tobacco Use    Smoking status: Never    Smokeless tobacco: Never   Substance and Sexual Activity    Alcohol use: Not Currently     Comment: No alcohol for the past 4 months       VITALS:  Patient Vitals for the past 24 hrs:   BP Temp Temp src Pulse Resp SpO2 Height   11/11/23 0627 136/84 -- -- 97 -- 95 % --   11/11/23 0356 (!) 164/103 98.3  F (36.8  C) Temporal 100 24 95 % 1.778 m (5' 10\")       PHYSICAL EXAM    Constitutional:  Well developed, Well nourished,  HENT:  Normocephalic, Atraumatic, Oropharynx moist, Nose normal.   Eyes:  EOMI, Conjunctiva normal, No discharge.   Respiratory:  Normal breath sounds, No respiratory distress, No wheezing, No chest tenderness.   Cardiovascular:  Normal heart rate, Normal rhythm, No murmurs  GI:  Soft, No tenderness, No guarding, No CVA tenderness.   Musculoskeletal:  No tenderness to palpation or major deformities noted.   Extremities: No lower extremity edema.  Neurologic:  Alert & oriented x 3, No focal deficits noted.   Psychiatric:  Affect normal, Judgment normal, Mood normal.        LAB:  All pertinent labs reviewed and interpreted.  Results for orders placed or performed during the hospital encounter of 11/11/23              UA with Microscopic reflex to Culture    " Specimen: Urine, Clean Catch   Result Value Ref Range    Color Urine Light Yellow Colorless, Straw, Light Yellow, Yellow    Appearance Urine Turbid (A) Clear    Glucose Urine Negative Negative mg/dL    Bilirubin Urine Negative Negative    Ketones Urine Negative Negative mg/dL    Specific Gravity Urine 1.011 1.001 - 1.030    Blood Urine >1.0 mg/dL (A) Negative    pH Urine 6.5 5.0 - 7.0    Protein Albumin Urine Negative Negative mg/dL    Urobilinogen Urine <2.0 <2.0 mg/dL    Nitrite Urine Negative Negative    Leukocyte Esterase Urine Negative Negative    Bacteria Urine Few (A) None Seen /HPF    RBC Urine >182 (H) <=2 /HPF    WBC Urine 0 <=5 /HPF   CBC with platelets   Result Value Ref Range    WBC Count 11.7 (H) 4.0 - 11.0 10e3/uL    RBC Count 5.74 4.40 - 5.90 10e6/uL    Hemoglobin 15.9 13.3 - 17.7 g/dL    Hematocrit 47.4 40.0 - 53.0 %    MCV 83 78 - 100 fL    MCH 27.7 26.5 - 33.0 pg    MCHC 33.5 31.5 - 36.5 g/dL    RDW 14.3 10.0 - 15.0 %    Platelet Count 645 (H) 150 - 450 10e3/uL   Basic metabolic panel   Result Value Ref Range    Sodium 140 135 - 145 mmol/L    Potassium 4.0 3.4 - 5.3 mmol/L    Chloride 104 98 - 107 mmol/L    Carbon Dioxide (CO2) 25 22 - 29 mmol/L    Anion Gap 11 7 - 15 mmol/L    Urea Nitrogen 14.9 6.0 - 20.0 mg/dL    Creatinine 0.99 0.67 - 1.17 mg/dL    GFR Estimate >90 >60 mL/min/1.73m2    Calcium 10.5 (H) 8.6 - 10.0 mg/dL    Glucose 140 (H) 70 - 99 mg/dL       RADIOLOGY:  I have independently reviewed and interpreted the above imaging, pending the final radiology read.  CT Abdomen Pelvis w Contrast   Final Result   IMPRESSION:    1.  0.4 cm proximal left ureteral calculus, resulting in mild to moderate obstruction.   2.  A few tiny nonobstructing left renal calculi.                I, Armani Winter, am serving as a scribe to document services personally performed by Dr. Ernst based on my observation and the provider's statements to me. I, Austin Ernst, DO attest that Armani Winter is acting in  a scribe capacity, has observed my performance of the services and has documented them in accordance with my direction.    Austin Ernst DO  Emergency Medicine  [facility] EMERGENCY ROOM  [address] [facility] [id number] [phone]  Dept: [phone]     Austin Ernst MD  11/11/23 3897

## 2023-11-11 NOTE — ED TRIAGE NOTES
Pt has history of kidney stones, woke a short time ago with left flank pain, feels just like previous kidney stones.  Denies urinary symptoms     Triage Assessment (Adult)       Row Name 11/11/23 0354          Triage Assessment    Airway WDL WDL        Respiratory WDL    Respiratory WDL WDL        Skin Circulation/Temperature WDL    Skin Circulation/Temperature WDL WDL        Cardiac WDL    Cardiac WDL WDL        Peripheral/Neurovascular WDL    Peripheral Neurovascular WDL WDL        Cognitive/Neuro/Behavioral WDL    Cognitive/Neuro/Behavioral WDL WDL

## 2024-01-09 ENCOUNTER — VIRTUAL VISIT (OUTPATIENT)
Dept: UROLOGY | Facility: CLINIC | Age: 44
End: 2024-01-09
Attending: EMERGENCY MEDICINE
Payer: COMMERCIAL

## 2024-01-09 DIAGNOSIS — N20.0 KIDNEY STONE: ICD-10-CM

## 2024-01-09 PROCEDURE — 99204 OFFICE O/P NEW MOD 45 MIN: CPT | Mod: 95 | Performed by: PHYSICIAN ASSISTANT

## 2024-01-09 RX ORDER — TAMSULOSIN HYDROCHLORIDE 0.4 MG/1
0.4 CAPSULE ORAL DAILY
Qty: 21 CAPSULE | Refills: 3 | Status: SHIPPED | OUTPATIENT
Start: 2024-01-09

## 2024-01-09 ASSESSMENT — PAIN SCALES - GENERAL: PAINLEVEL: NO PAIN (0)

## 2024-01-09 NOTE — PROGRESS NOTES
How would you like to obtain your AVS? [employer]  If the video visit is dropped, the invitation should be resent by: Text to cell phone: [phone]  Will anyone else be joining your video visit? No          Video-Visit Details    Type of service:  Video Visit     Originating Location (pt. Location): Home    Distant Location (provider location):  On-site  Platform used for Video Visit: [employer]  Start time:10:00am  End time: 10:25am    CC: left ureteral stone.    HPI: It is a pleasure to see Mr. Ramiro Kessler, a pleasant 43 year old male seen today in ER follow up for evaluation of the above. This was first detected on CT in the ED on 11/11/23 after the patient presented complaining of acute renal colic symptoms. The CT noted: 0.4 cm proximal left ureteral calculus. Mild to moderate dilatation of the left intrarenal collecting system. Relative delayed enhancement of the left kidney. Mild left perinephric haziness. A few nonobstructing left renal calculi (2-3 stones), all measuring less than 0.4 cm in diameter. UA in the ED was negative for infection. BMP revealed Ca 10.5 and Cr to be normal. With pain under good control, the patient was discharged with instructions to follow up with urology.    Currently, the patient reports continued intermittent left flank pain., pain after urination at the urethra at times. He was not given a strainer. Denies gross hematuria, dysuria, fevers, chills, N/V. Has prior history of kidney stones.  He has not needed surgery. Father has history or kidney stones.     ADDENDUM 1/12/24: CT 1/11/24 to eval for passage of ureteral stone  There are three nonobstructing stones in the left kidney measuring up to 3 mm. Kidneys are otherwise negative. Previously seen obstructing stone in the left ureter on 11/11/2023 is no longer present. No ureteral stones or hydronephrosis.   Bladder is unremarkable.    RECENT IMAGING:  Narrative & Impression   EXAM: CT ABDOMEN AND PELVIS WITH CONTRAST  LOCATION:  [facility]  DATE/TIME: 11/11/2023, 6:05 AM CST     INDICATION: Left flank pain.  COMPARISON: 02/17/2022.     TECHNIQUE: CT scan of the abdomen and pelvis was performed following injection of IV contrast. Multiplanar reformats were obtained. Dose reduction techniques were used.  CONTRAST: 100 mL Isovue 370.     FINDINGS:     LOWER CHEST: Several curvilinear opacities in the lung bases, likely representing scarring and/or atelectasis.     HEPATOBILIARY: Prior cholecystectomy.     SPLEEN: Unremarkable.     PANCREAS: Unremarkable.     ADRENAL GLANDS: Unremarkable.     KIDNEYS/BLADDER: 0.4 cm proximal left ureteral calculus. Mild to moderate dilatation of the left intrarenal collecting system. Relative delayed enhancement of the left kidney. Mild left perinephric haziness. A few nonobstructing left renal calculi, all   measuring less than 0.4 cm in diameter. No suspicious renal lesions. No visualized bladder calculi.     BOWEL: A few colonic diverticula are present without evidence of diverticulitis. Normal appendix.     LYMPH NODES: Unremarkable.     PELVIC ORGANS: No acute findings.     MUSCULOSKELETAL: No acute findings.     OTHER: None.                                                                      IMPRESSION:   1.  0.4 cm proximal left ureteral calculus, resulting in mild to moderate obstruction.  2.  A few tiny nonobstructing left renal calculi.         No past medical history on file.    Past Surgical History:   Procedure Laterality Date    LAPAROSCOPIC CHOLECYSTECTOMY N/A 4/15/2022    Procedure: CHOLECYSTECTOMY, LAPAROSCOPIC;  Surgeon: Flores Olmedo MD;  Location: [facility] Main OR    NO PAST SURGERIES         Current Outpatient Medications   Medication Sig Dispense Refill    albuterol (PROAIR HFA/PROVENTIL HFA/VENTOLIN HFA) 108 (90 Base) MCG/ACT inhaler Inhale 1-2 puffs into the lungs every 4 hours as needed for shortness of breath / dyspnea or wheezing      LORazepam (ATIVAN)  "0.5 MG tablet Take 0.5 mg by mouth nightly as needed for anxiety         No Known Allergies    FAMILY HISTORY: There is no family h/o  malignancy.  There is  family h/o urolithiasis.     Social History     Socioeconomic History    Marital status:      Spouse name: Not on file    Number of children: Not on file    Years of education: Not on file    Highest education level: Not on file   Occupational History    Not on file   Tobacco Use    Smoking status: Never    Smokeless tobacco: Never   Substance and Sexual Activity    Alcohol use: Not Currently     Comment: No alcohol for the past 4 months    Drug use: Not on file    Sexual activity: Not on file   Other Topics Concern    Not on file   Social History Narrative    Not on file     Social Determinants of Health     Financial Resource Strain: Not on file   Food Insecurity: Not on file   Transportation Needs: Not on file   Physical Activity: Not on file   Stress: Not on file   Social Connections: Not on file   Interpersonal Safety: Not on file   Housing Stability: Not on file       GENERAL PHYSICAL EXAM:   There were no vitals taken for this visit.  PSYCH: NAD  NEURO: AAO x3    No results found for: \"PSA\"  Lab Results   Component Value Date    CR 0.99 11/11/2023    CR 0.93 04/15/2022    CR 0.78 02/17/2022       ASSESSMENT AND PLAN: 43 year old male with a left-sided calculus with associated  hydronephrosis.   - Start tamsulosin 0.4 mg daily. Refills provided.  - Continue to push fluids.   - CT to eval passage of ureteral stone.  If persists, needs surgical removal.  - Warning signs discussed including fevers, chills, N/V, gross hematuria, severe pain that is refractory to medications which should prompt more urgent evaluation. Patient understands to proceed to the ER should these warning signs occur outside of clinic hours.    Standard recommendations for kidney stone prevention were discussed.  Neph referral for medical management and prevention.     Ana " JAYJAY German  [facility]      30 minutes spent on the date of the encounter doing chart review, review of outside records, review of test results, interpretation of tests, patient visit and documentation and review of CT images.

## 2024-01-09 NOTE — NURSING NOTE
Is the patient currently in the state of [place]? YES    Visit mode:VIDEO    If the visit is dropped, the patient can be reconnected by: VIDEO VISIT: Text to cell phone:   Telephone Information:   Mobile [phone]       Will anyone else be joining the visit? NO  (If patient encounters technical issues they should call [phone]    How would you like to obtain your AVS? MyChart    Are changes needed to the allergy or medication list? No    Reason for visit: Consult    Yumiko SCOTT

## 2024-01-09 NOTE — PATIENT INSTRUCTIONS
Please make an appt with Nephrology for medical management and prevention of kidney stones: [facility] will call to coordinate this. If you do not hear from a representative within two business days, please call [phone].       - CT scan: Please call [phone] to schedule this.    You have been prescribed Flomax (tamsulosin).    Flomax is prescribed for patients with kidney stones.  It helps improve chances of stone passage by relaxing the smooth muscle in the ureter (the tube that drains the kidney).    Common side effects include:  Weakness or fatigue  Blurred vision  Sinus congestion or runny nose  Dizziness or lightheadedness  Decrease our lack of ejaculation        Standard recommendations for kidney stone prevention:  -These include maintaining fluid intake of 3 liters per day or more with a goal of making 2 or more liters of urine per day.  If alcoholic or caffeinated beverages are consumed, you need to drink water along with these beverages to maintain hydration.    -A few ounces of lemon juice concentrate a day diluted in water can help prevent stones (citrate is a stone inhibitor).    -Sodium influences calcium excretion in the urine. Limit intake of red meat, salt, and salty processed foods.    -Uric acid-high levels in the blood can lead to kidney stones and gout, especially if the urine pH is low.  Reduce protein intake, especially red meats.  -Weight loss, if overweight, can reduce the recurrence of kidney stones.  -Diabetes-if diabetic, you are at a greater risk of having kidney stones.  -Maintain calcium intake in diet through continued consumption of dairy products etc.    -Limit foods that are high in oxalate such as spinach, sweet potatoes, dark chocolate, soy products, and some nuts such as peanuts.   -Medications that can increase risk of kidney stones: Ephedrine, Guaifenesin, Triamterene, Lasix, Diamox, Topamax, Zonegran, laxatives.     -Additional/different recommendations  pending any stone analysis.

## 2024-01-09 NOTE — LETTER
1/9/2024       RE: Ramiro Kesslre  [address] [address]     Dear Colleague,    Thank you for referring your patient, Ramiro Kessler, to the [facility] at [facility]. Please see a copy of my visit note below.    How would you like to obtain your AVS? MyChart  If the video visit is dropped, the invitation should be resent by: Text to cell phone: [phone]  Will anyone else be joining your video visit? No          Video-Visit Details    Type of service:  Video Visit     Originating Location (pt. Location): Home    Distant Location (provider location):  On-site  Platform used for Video Visit: [employer]  Start time:10:00am  End time: 10:25am    CC: left ureteral stone.    HPI: It is a pleasure to see . Ramiro Kessler, a pleasant 43 year old male seen today in ER follow up for evaluation of the above. This was first detected on CT in the ED on 11/11/23 after the patient presented complaining of acute renal colic symptoms. The CT noted: 0.4 cm proximal left ureteral calculus. Mild to moderate dilatation of the left intrarenal collecting system. Relative delayed enhancement of the left kidney. Mild left perinephric haziness. A few nonobstructing left renal calculi (2-3 stones), all measuring less than 0.4 cm in diameter. UA in the ED was negative for infection. BMP revealed Ca 10.5 and Cr to be normal. With pain under good control, the patient was discharged with instructions to follow up with urology.    Currently, the patient reports continued intermittent left flank pain., pain after urination at the urethra at times. He was not given a strainer. Denies gross hematuria, dysuria, fevers, chills, N/V. Has prior history of kidney stones.  He has not needed surgery. Father has history or kidney stones.     RECENT IMAGING:  Narrative & Impression   EXAM: CT ABDOMEN AND PELVIS WITH CONTRAST  LOCATION: [facility]  [facility]  DATE/TIME: 11/11/2023, 6:05 AM CST     INDICATION: Left flank pain.  COMPARISON: 02/17/2022.     TECHNIQUE: CT scan of the abdomen and pelvis was performed following injection of IV contrast. Multiplanar reformats were obtained. Dose reduction techniques were used.  CONTRAST: 100 mL Isovue 370.     FINDINGS:     LOWER CHEST: Several curvilinear opacities in the lung bases, likely representing scarring and/or atelectasis.     HEPATOBILIARY: Prior cholecystectomy.     SPLEEN: Unremarkable.     PANCREAS: Unremarkable.     ADRENAL GLANDS: Unremarkable.     KIDNEYS/BLADDER: 0.4 cm proximal left ureteral calculus. Mild to moderate dilatation of the left intrarenal collecting system. Relative delayed enhancement of the left kidney. Mild left perinephric haziness. A few nonobstructing left renal calculi, all   measuring less than 0.4 cm in diameter. No suspicious renal lesions. No visualized bladder calculi.     BOWEL: A few colonic diverticula are present without evidence of diverticulitis. Normal appendix.     LYMPH NODES: Unremarkable.     PELVIC ORGANS: No acute findings.     MUSCULOSKELETAL: No acute findings.     OTHER: None.                                                                      IMPRESSION:   1.  0.4 cm proximal left ureteral calculus, resulting in mild to moderate obstruction.  2.  A few tiny nonobstructing left renal calculi.         No past medical history on file.    Past Surgical History:   Procedure Laterality Date    LAPAROSCOPIC CHOLECYSTECTOMY N/A 4/15/2022    Procedure: CHOLECYSTECTOMY, LAPAROSCOPIC;  Surgeon: Flores Olmedo MD;  Location: [facility] Main OR    NO PAST SURGERIES         Current Outpatient Medications   Medication Sig Dispense Refill    albuterol (PROAIR HFA/PROVENTIL HFA/VENTOLIN HFA) 108 (90 Base) MCG/ACT inhaler Inhale 1-2 puffs into the lungs every 4 hours as needed for shortness of breath / dyspnea or wheezing      LORazepam (ATIVAN) 0.5 MG tablet Take  "0.5 mg by mouth nightly as needed for anxiety         No Known Allergies    FAMILY HISTORY: There is no family h/o  malignancy.  There is  family h/o urolithiasis.     Social History     Socioeconomic History    Marital status:      Spouse name: Not on file    Number of children: Not on file    Years of education: Not on file    Highest education level: Not on file   Occupational History    Not on file   Tobacco Use    Smoking status: Never    Smokeless tobacco: Never   Substance and Sexual Activity    Alcohol use: Not Currently     Comment: No alcohol for the past 4 months    Drug use: Not on file    Sexual activity: Not on file   Other Topics Concern    Not on file   Social History Narrative    Not on file     Social Determinants of Health     Financial Resource Strain: Not on file   Food Insecurity: Not on file   Transportation Needs: Not on file   Physical Activity: Not on file   Stress: Not on file   Social Connections: Not on file   Interpersonal Safety: Not on file   Housing Stability: Not on file       GENERAL PHYSICAL EXAM:   There were no vitals taken for this visit.  PSYCH: NAD  NEURO: AAO x3    No results found for: \"PSA\"  Lab Results   Component Value Date    CR 0.99 11/11/2023    CR 0.93 04/15/2022    CR 0.78 02/17/2022       ASSESSMENT AND PLAN: 43 year old male with a left-sided calculus with associated  hydronephrosis.   - Start tamsulosin 0.4 mg daily. Refills provided.  - Continue to push fluids.   - CT to eval passage of ureteral stone.  If persists, needs surgical removal.  - Warning signs discussed including fevers, chills, N/V, gross hematuria, severe pain that is refractory to medications which should prompt more urgent evaluation. Patient understands to proceed to the ER should these warning signs occur outside of clinic hours.    Standard recommendations for kidney stone prevention were discussed.  Neph referral for medical management and prevention.     JAYJAY Zee " [facility]      30 minutes spent on the date of the encounter doing chart review, review of outside records, review of test results, interpretation of tests, patient visit and documentation and review of CT images.

## 2024-01-11 ENCOUNTER — HOSPITAL ENCOUNTER (OUTPATIENT)
Dept: CT IMAGING | Facility: CLINIC | Age: 44
Discharge: HOME | End: 2024-01-11
Attending: PHYSICIAN ASSISTANT | Admitting: PHYSICIAN ASSISTANT
Payer: COMMERCIAL

## 2024-01-11 DIAGNOSIS — N20.0 KIDNEY STONE: ICD-10-CM

## 2024-01-11 PROCEDURE — 74176 CT ABD & PELVIS W/O CONTRAST: CPT

## 2024-01-17 ENCOUNTER — VIRTUAL VISIT (OUTPATIENT)
Dept: UROLOGY | Facility: CLINIC | Age: 44
End: 2024-01-17
Payer: COMMERCIAL

## 2024-01-17 VITALS — WEIGHT: 270 LBS | BODY MASS INDEX: 38.74 KG/M2

## 2024-01-17 DIAGNOSIS — N20.0 KIDNEY STONE: Primary | ICD-10-CM

## 2024-01-17 PROCEDURE — 99214 OFFICE O/P EST MOD 30 MIN: CPT | Mod: 95 | Performed by: PHYSICIAN ASSISTANT

## 2024-01-17 ASSESSMENT — PAIN SCALES - GENERAL: PAINLEVEL: NO PAIN (0)

## 2024-01-17 NOTE — PROGRESS NOTES
Video-Visit Details    Type of service:  Video Visit     Originating Location (pt. Location): Home    Distant Location (provider location):  On-site  Platform used for Video Visit: Leann  Start time:10:09am  End time: 10:14am    CC: left ureteral stone.    HPI: It is a pleasure to see Mr. Ramiro Kessler, a pleasant 43 year old male seen today in ER follow up for evaluation of the above. This was first detected on CT in the ED on 11/11/23 after the patient presented complaining of acute renal colic symptoms. The CT noted: 0.4 cm proximal left ureteral calculus. Mild to moderate dilatation of the left intrarenal collecting system. Relative delayed enhancement of the left kidney. Mild left perinephric haziness. A few nonobstructing left renal calculi (2-3 stones), all measuring less than 0.4 cm in diameter. UA in the ED was negative for infection. BMP revealed Ca 10.5 and Cr to be normal. With pain under good control, the patient was discharged with instructions to follow up with urology.    Currently, the patient reports continued intermittent left flank pain., pain after urination at the urethra at times. He was not given a strainer. Denies gross hematuria, dysuria, fevers, chills, N/V. Has prior history of kidney stones.  He has not needed surgery. Father has history or kidney stones.     ADDENDUM 1/12/24: CT 1/11/24 to eval for passage of ureteral stone  There are three nonobstructing stones in the left kidney measuring up to 3 mm. Kidneys are otherwise negative. Previously seen obstructing stone in the left ureter on 11/11/2023 is no longer present. No ureteral stones or hydronephrosis.   Bladder is unremarkable.    TODAY   CT notes passage of stone. Urinary symptoms resolved.       RECENT IMAGING:  Narrative & Impression   EXAM: CT ABDOMEN PELVIS W/O CONTRAST  LOCATION: [facility]  DATE: 1/11/2024     INDICATION: Eval for passage of prox left ureteral stone from 11/11/2023 CT, or ?  different new ureteral stone.  COMPARISON: 11/11/2023.  TECHNIQUE: CT scan of the abdomen and pelvis was performed without IV contrast. Multiplanar reformats were obtained. Dose reduction techniques were used.  CONTRAST: None.     FINDINGS:   LOWER CHEST: Mild subpleural linear scarring and/or atelectasis in the lower lungs greater on the right.     HEPATOBILIARY: Liver is negative. Gallbladder is absent. No biliary dilatation.     PANCREAS: Normal.     SPLEEN: Normal.     ADRENAL GLANDS: Normal.     KIDNEYS/BLADDER: There are three nonobstructing stones in the left kidney measuring up to 3 mm. Kidneys are otherwise negative. Previously seen obstructing stone in the left ureter on 11/11/2023 is no longer present. No ureteral stones or hydronephrosis.   Bladder is unremarkable.     BOWEL: Normal.     LYMPH NODES: Normal.     VASCULATURE: Unremarkable.     PELVIC ORGANS: Normal.     MUSCULOSKELETAL: Bilateral pars defect at the L4 level with low-grade anterolisthesis of L4 on L5.                                                                      IMPRESSION:   1.  Previously seen obstructing left ureteral stone on 11/11/2023 is no longer present. The hydronephrosis has resolved.     2.  No acute findings in the abdomen or pelvis.     3.  There are three nonobstructing stones in the left kidney measuring up to 3 mm. No other urinary calculi.     4.  Normal appendix. No acute bowel findings.         Narrative & Impression   EXAM: CT ABDOMEN AND PELVIS WITH CONTRAST  LOCATION: [facility]  DATE/TIME: 11/11/2023, 6:05 AM CST     INDICATION: Left flank pain.  COMPARISON: 02/17/2022.     TECHNIQUE: CT scan of the abdomen and pelvis was performed following injection of IV contrast. Multiplanar reformats were obtained. Dose reduction techniques were used.  CONTRAST: 100 mL Isovue 370.     FINDINGS:     LOWER CHEST: Several curvilinear opacities in the lung bases, likely representing scarring and/or  atelectasis.     HEPATOBILIARY: Prior cholecystectomy.     SPLEEN: Unremarkable.     PANCREAS: Unremarkable.     ADRENAL GLANDS: Unremarkable.     KIDNEYS/BLADDER: 0.4 cm proximal left ureteral calculus. Mild to moderate dilatation of the left intrarenal collecting system. Relative delayed enhancement of the left kidney. Mild left perinephric haziness. A few nonobstructing left renal calculi, all   measuring less than 0.4 cm in diameter. No suspicious renal lesions. No visualized bladder calculi.     BOWEL: A few colonic diverticula are present without evidence of diverticulitis. Normal appendix.     LYMPH NODES: Unremarkable.     PELVIC ORGANS: No acute findings.     MUSCULOSKELETAL: No acute findings.     OTHER: None.                                                                      IMPRESSION:   1.  0.4 cm proximal left ureteral calculus, resulting in mild to moderate obstruction.  2.  A few tiny nonobstructing left renal calculi.         No past medical history on file.    Past Surgical History:   Procedure Laterality Date    LAPAROSCOPIC CHOLECYSTECTOMY N/A 4/15/2022    Procedure: CHOLECYSTECTOMY, LAPAROSCOPIC;  Surgeon: Flores Olmedo MD;  Location: [facility] Main OR    NO PAST SURGERIES         Current Outpatient Medications   Medication Sig Dispense Refill    albuterol (PROAIR HFA/PROVENTIL HFA/VENTOLIN HFA) 108 (90 Base) MCG/ACT inhaler Inhale 1-2 puffs into the lungs every 4 hours as needed for shortness of breath / dyspnea or wheezing      LORazepam (ATIVAN) 0.5 MG tablet Take 0.5 mg by mouth nightly as needed for anxiety      tamsulosin (FLOMAX) 0.4 MG capsule Take 1 capsule (0.4 mg) by mouth daily 21 capsule 3       No Known Allergies    FAMILY HISTORY: There is no family h/o  malignancy.  There is family h/o urolithiasis.     Social History     Socioeconomic History    Marital status:      Spouse name: Not on file    Number of children: Not on file    Years of education: Not on file     "Highest education level: Not on file   Occupational History    Not on file   Tobacco Use    Smoking status: Never    Smokeless tobacco: Never   Substance and Sexual Activity    Alcohol use: Not Currently     Comment: No alcohol for the past 4 months    Drug use: Not on file    Sexual activity: Not on file   Other Topics Concern    Not on file   Social History Narrative    Not on file     Social Determinants of Health     Financial Resource Strain: Not on file   Food Insecurity: Not on file   Transportation Needs: Not on file   Physical Activity: Not on file   Stress: Not on file   Social Connections: Not on file   Interpersonal Safety: Not on file   Housing Stability: Not on file       GENERAL PHYSICAL EXAM:   Wt 122.5 kg (270 lb)   BMI 38.74 kg/m    PSYCH: NAD  NEURO: AAO x3    No results found for: \"PSA\"  Lab Results   Component Value Date    CR 0.99 11/11/2023    CR 0.93 04/15/2022    CR 0.78 02/17/2022       ASSESSMENT AND PLAN: 43 year old male with a left-sided calculus with associated hydronephrosis, passage based on recent CT.   - CT 1 year to follow-up small renal stones.  - Warning signs discussed including fevers, chills, N/V, gross hematuria, severe pain that is refractory to medications which should prompt more urgent evaluation. Patient understands to proceed to the ER should these warning signs occur outside of clinic hours.    Standard recommendations for kidney stone prevention were discussed.  Neph referral for medical management and prevention.     Ana German PA-C  [facility]      22 minutes spent on the date of the encounter doing chart review, review of outside records, review of test results, interpretation of tests, patient visit and documentation and review of CT images.       "

## 2024-01-17 NOTE — NURSING NOTE
Is the patient currently in the state of [place]? YES    Visit mode:VIDEO    If the visit is dropped, the patient can be reconnected by: VIDEO VISIT: Text to cell phone:   Telephone Information:   Mobile [phone]       Will anyone else be joining the visit? NO  (If patient encounters technical issues they should call [phone])    How would you like to obtain your AVS? MyChart    Are changes needed to the allergy or medication list? No    Reason for visit: RECHECK    Karen SCOTT       elevator building (reports there are a few steps to enter, doormen usually assist getting w/c up/down stairs)/spouse

## 2024-01-17 NOTE — LETTER
1/17/2024       RE: Ramiro Kessler  [id number] [address]     Dear Colleague,    Thank you for referring your patient, Ramiro Kessler, to the [facility] at [facility]. Please see a copy of my visit note below.    Video-Visit Details    Type of service:  Video Visit     Originating Location (pt. Location): Home    Distant Location (provider location):  On-site  Platform used for Video Visit: [employer]  Start time:10:09am  End time: 10:14am    CC: left ureteral stone.    HPI: It is a pleasure to see . Ramiro Kessler, a pleasant 43 year old male seen today in ER follow up for evaluation of the above. This was first detected on CT in the ED on 11/11/23 after the patient presented complaining of acute renal colic symptoms. The CT noted: 0.4 cm proximal left ureteral calculus. Mild to moderate dilatation of the left intrarenal collecting system. Relative delayed enhancement of the left kidney. Mild left perinephric haziness. A few nonobstructing left renal calculi (2-3 stones), all measuring less than 0.4 cm in diameter. UA in the ED was negative for infection. BMP revealed Ca 10.5 and Cr to be normal. With pain under good control, the patient was discharged with instructions to follow up with urology.    Currently, the patient reports continued intermittent left flank pain., pain after urination at the urethra at times. He was not given a strainer. Denies gross hematuria, dysuria, fevers, chills, N/V. Has prior history of kidney stones.  He has not needed surgery. Father has history or kidney stones.     ADDENDUM 1/12/24: CT 1/11/24 to eval for passage of ureteral stone  There are three nonobstructing stones in the left kidney measuring up to 3 mm. Kidneys are otherwise negative. Previously seen obstructing stone in the left ureter on 11/11/2023 is no longer present. No ureteral stones or hydronephrosis.   Bladder is  unremarkable.    TODAY   CT notes passage of stone. Urinary symptoms resolved.       RECENT IMAGING:  Narrative & Impression   EXAM: CT ABDOMEN PELVIS W/O CONTRAST  LOCATION: [facility]  DATE: 1/11/2024     INDICATION: Eval for passage of prox left ureteral stone from 11/11/2023 CT, or ? different new ureteral stone.  COMPARISON: 11/11/2023.  TECHNIQUE: CT scan of the abdomen and pelvis was performed without IV contrast. Multiplanar reformats were obtained. Dose reduction techniques were used.  CONTRAST: None.     FINDINGS:   LOWER CHEST: Mild subpleural linear scarring and/or atelectasis in the lower lungs greater on the right.     HEPATOBILIARY: Liver is negative. Gallbladder is absent. No biliary dilatation.     PANCREAS: Normal.     SPLEEN: Normal.     ADRENAL GLANDS: Normal.     KIDNEYS/BLADDER: There are three nonobstructing stones in the left kidney measuring up to 3 mm. Kidneys are otherwise negative. Previously seen obstructing stone in the left ureter on 11/11/2023 is no longer present. No ureteral stones or hydronephrosis.   Bladder is unremarkable.     BOWEL: Normal.     LYMPH NODES: Normal.     VASCULATURE: Unremarkable.     PELVIC ORGANS: Normal.     MUSCULOSKELETAL: Bilateral pars defect at the L4 level with low-grade anterolisthesis of L4 on L5.                                                                      IMPRESSION:   1.  Previously seen obstructing left ureteral stone on 11/11/2023 is no longer present. The hydronephrosis has resolved.     2.  No acute findings in the abdomen or pelvis.     3.  There are three nonobstructing stones in the left kidney measuring up to 3 mm. No other urinary calculi.     4.  Normal appendix. No acute bowel findings.         Narrative & Impression   EXAM: CT ABDOMEN AND PELVIS WITH CONTRAST  LOCATION: [facility]  DATE/TIME: 11/11/2023, 6:05 AM CST     INDICATION: Left flank pain.  COMPARISON: 02/17/2022.      TECHNIQUE: CT scan of the abdomen and pelvis was performed following injection of IV contrast. Multiplanar reformats were obtained. Dose reduction techniques were used.  CONTRAST: 100 mL Isovue 370.     FINDINGS:     LOWER CHEST: Several curvilinear opacities in the lung bases, likely representing scarring and/or atelectasis.     HEPATOBILIARY: Prior cholecystectomy.     SPLEEN: Unremarkable.     PANCREAS: Unremarkable.     ADRENAL GLANDS: Unremarkable.     KIDNEYS/BLADDER: 0.4 cm proximal left ureteral calculus. Mild to moderate dilatation of the left intrarenal collecting system. Relative delayed enhancement of the left kidney. Mild left perinephric haziness. A few nonobstructing left renal calculi, all   measuring less than 0.4 cm in diameter. No suspicious renal lesions. No visualized bladder calculi.     BOWEL: A few colonic diverticula are present without evidence of diverticulitis. Normal appendix.     LYMPH NODES: Unremarkable.     PELVIC ORGANS: No acute findings.     MUSCULOSKELETAL: No acute findings.     OTHER: None.                                                                      IMPRESSION:   1.  0.4 cm proximal left ureteral calculus, resulting in mild to moderate obstruction.  2.  A few tiny nonobstructing left renal calculi.         No past medical history on file.    Past Surgical History:   Procedure Laterality Date    LAPAROSCOPIC CHOLECYSTECTOMY N/A 4/15/2022    Procedure: CHOLECYSTECTOMY, LAPAROSCOPIC;  Surgeon: Flores Olmedo MD;  Location: [facility] Main OR    NO PAST SURGERIES         Current Outpatient Medications   Medication Sig Dispense Refill    albuterol (PROAIR HFA/PROVENTIL HFA/VENTOLIN HFA) 108 (90 Base) MCG/ACT inhaler Inhale 1-2 puffs into the lungs every 4 hours as needed for shortness of breath / dyspnea or wheezing      LORazepam (ATIVAN) 0.5 MG tablet Take 0.5 mg by mouth nightly as needed for anxiety      tamsulosin (FLOMAX) 0.4 MG capsule Take 1 capsule (0.4 mg) by  "mouth daily 21 capsule 3       No Known Allergies    FAMILY HISTORY: There is no family h/o  malignancy.  There is family h/o urolithiasis.     Social History     Socioeconomic History    Marital status:      Spouse name: Not on file    Number of children: Not on file    Years of education: Not on file    Highest education level: Not on file   Occupational History    Not on file   Tobacco Use    Smoking status: Never    Smokeless tobacco: Never   Substance and Sexual Activity    Alcohol use: Not Currently     Comment: No alcohol for the past 4 months    Drug use: Not on file    Sexual activity: Not on file   Other Topics Concern    Not on file   Social History Narrative    Not on file     Social Determinants of Health     Financial Resource Strain: Not on file   Food Insecurity: Not on file   Transportation Needs: Not on file   Physical Activity: Not on file   Stress: Not on file   Social Connections: Not on file   Interpersonal Safety: Not on file   Housing Stability: Not on file       GENERAL PHYSICAL EXAM:   Wt 122.5 kg (270 lb)   BMI 38.74 kg/m    PSYCH: NAD  NEURO: AAO x3    No results found for: \"PSA\"  Lab Results   Component Value Date    CR 0.99 11/11/2023    CR 0.93 04/15/2022    CR 0.78 02/17/2022       ASSESSMENT AND PLAN: 43 year old male with a left-sided calculus with associated hydronephrosis, passage based on recent CT.   - CT 1 year to follow-up small renal stones.  - Warning signs discussed including fevers, chills, N/V, gross hematuria, severe pain that is refractory to medications which should prompt more urgent evaluation. Patient understands to proceed to the ER should these warning signs occur outside of clinic hours.    Standard recommendations for kidney stone prevention were discussed.  Neph referral for medical management and prevention.     Ana German PA-C  [facility]      22 minutes spent on the date of the encounter doing chart review, review of outside records, " review of test results, interpretation of tests, patient visit and documentation and review of CT images.

## 2024-03-03 ENCOUNTER — HEALTH MAINTENANCE LETTER (OUTPATIENT)
Age: 44
End: 2024-03-03

## 2024-10-18 DIAGNOSIS — N20.0 KIDNEY STONE: Primary | ICD-10-CM

## 2025-03-15 ENCOUNTER — HEALTH MAINTENANCE LETTER (OUTPATIENT)
Age: 45
End: 2025-03-15

## (undated) DEVICE — ENDO TROCAR SHIELDED BLADED KII Z-THRD 11X100MM CTB33

## (undated) DEVICE — NDL INSUFFLATION 13GA 120MM C2201

## (undated) DEVICE — CLIP APPLIER ENDO ROTATING 10MM MED/LG ER320

## (undated) DEVICE — CUSTOM PACK LAP CHOLE SBA5BLCHEA

## (undated) DEVICE — ENDO TROCAR SHIELDED BLADED KII Z-THRD 05X100MM CTB03

## (undated) DEVICE — Device

## (undated) DEVICE — SOL RINGERS LACTATED 1000ML BAG 2B2324X

## (undated) DEVICE — DECANTER VIAL 2006S

## (undated) DEVICE — ENDO CLIP CARTIRDGE K2 MED/LG 10 1112

## (undated) DEVICE — PLATE GROUNDING ADULT W/CORD 9165L

## (undated) DEVICE — GLOVE SURG PI ULTRA TOUCH M SZ 6-1/2 LF

## (undated) DEVICE — SOL WATER IRRIG 1000ML BOTTLE 2F7114

## (undated) DEVICE — SUCTION MANIFOLD NEPTUNE 2 SYS 1 PORT 702-025-000

## (undated) DEVICE — SU MONOCRYL+ 4-0 18IN PS2 UND MCP496G

## (undated) DEVICE — BASIN EMESIS STERILE  SSK9005A

## (undated) DEVICE — GOWN LG DISP 9515

## (undated) DEVICE — TUBING LAP SUCT/IRRIG STRYKER 250070500

## (undated) DEVICE — PREP DURAPREP 26ML APL 8630

## (undated) DEVICE — ENDO SHEARS RENEW LAP ENDOCUT SCISSOR TIP 16.5MM 3142

## (undated) DEVICE — TUBING SMOKE EVAC PNEUMOCLEAR HIGH FLOW 0620050250

## (undated) DEVICE — ENDO TROCAR SLEEVE KII Z-THREADED 05X100MM CTS02

## (undated) DEVICE — MITT PRE-OP 7 L X 5 1/2 W 5177M1

## (undated) RX ORDER — LIDOCAINE HYDROCHLORIDE 10 MG/ML
INJECTION, SOLUTION EPIDURAL; INFILTRATION; INTRACAUDAL; PERINEURAL
Status: DISPENSED
Start: 2022-04-15

## (undated) RX ORDER — PROPOFOL 10 MG/ML
INJECTION, EMULSION INTRAVENOUS
Status: DISPENSED
Start: 2022-04-15

## (undated) RX ORDER — KETOROLAC TROMETHAMINE 30 MG/ML
INJECTION, SOLUTION INTRAMUSCULAR; INTRAVENOUS
Status: DISPENSED
Start: 2022-04-15

## (undated) RX ORDER — DEXAMETHASONE SODIUM PHOSPHATE 10 MG/ML
INJECTION, SOLUTION INTRAMUSCULAR; INTRAVENOUS
Status: DISPENSED
Start: 2022-04-15

## (undated) RX ORDER — ONDANSETRON 2 MG/ML
INJECTION INTRAMUSCULAR; INTRAVENOUS
Status: DISPENSED
Start: 2022-04-15